# Patient Record
Sex: MALE | Race: WHITE | NOT HISPANIC OR LATINO | Employment: UNEMPLOYED | URBAN - METROPOLITAN AREA
[De-identification: names, ages, dates, MRNs, and addresses within clinical notes are randomized per-mention and may not be internally consistent; named-entity substitution may affect disease eponyms.]

---

## 2020-11-10 ENCOUNTER — RECORDS - HEALTHEAST (OUTPATIENT)
Dept: LAB | Facility: CLINIC | Age: 55
End: 2020-11-10

## 2020-11-11 LAB
ALBUMIN SERPL-MCNC: 3.1 G/DL (ref 3.5–5)
ALP SERPL-CCNC: 83 U/L (ref 45–120)
ALT SERPL W P-5'-P-CCNC: 18 U/L (ref 0–45)
ANION GAP SERPL CALCULATED.3IONS-SCNC: 8 MMOL/L (ref 5–18)
AST SERPL W P-5'-P-CCNC: 13 U/L (ref 0–40)
BILIRUB SERPL-MCNC: 0.4 MG/DL (ref 0–1)
BUN SERPL-MCNC: 17 MG/DL (ref 8–22)
CALCIUM SERPL-MCNC: 8.9 MG/DL (ref 8.5–10.5)
CHLORIDE BLD-SCNC: 103 MMOL/L (ref 98–107)
CO2 SERPL-SCNC: 28 MMOL/L (ref 22–31)
CREAT SERPL-MCNC: 1.01 MG/DL (ref 0.7–1.3)
ERYTHROCYTE [DISTWIDTH] IN BLOOD BY AUTOMATED COUNT: 13.9 % (ref 11–14.5)
GFR SERPL CREATININE-BSD FRML MDRD: >60 ML/MIN/1.73M2
GLUCOSE BLD-MCNC: 41 MG/DL (ref 70–125)
HCT VFR BLD AUTO: 38.3 % (ref 40–54)
HGB BLD-MCNC: 12.6 G/DL (ref 14–18)
MAGNESIUM SERPL-MCNC: 1.9 MG/DL (ref 1.8–2.6)
MCH RBC QN AUTO: 30.4 PG (ref 27–34)
MCHC RBC AUTO-ENTMCNC: 32.9 G/DL (ref 32–36)
MCV RBC AUTO: 93 FL (ref 80–100)
PLATELET # BLD AUTO: 174 THOU/UL (ref 140–440)
PMV BLD AUTO: 11.3 FL (ref 8.5–12.5)
POTASSIUM BLD-SCNC: 5.1 MMOL/L (ref 3.5–5)
PROT SERPL-MCNC: 6.6 G/DL (ref 6–8)
RBC # BLD AUTO: 4.14 MILL/UL (ref 4.4–6.2)
SODIUM SERPL-SCNC: 139 MMOL/L (ref 136–145)
VIT B12 SERPL-MCNC: 235 PG/ML (ref 213–816)
WBC: 6.5 THOU/UL (ref 4–11)

## 2020-11-19 ENCOUNTER — RECORDS - HEALTHEAST (OUTPATIENT)
Dept: LAB | Facility: CLINIC | Age: 55
End: 2020-11-19

## 2020-11-20 ENCOUNTER — RECORDS - HEALTHEAST (OUTPATIENT)
Dept: LAB | Facility: CLINIC | Age: 55
End: 2020-11-20

## 2020-11-23 LAB
ALBUMIN SERPL-MCNC: 3.1 G/DL (ref 3.5–5)
ALP SERPL-CCNC: 93 U/L (ref 45–120)
ALT SERPL W P-5'-P-CCNC: <9 U/L (ref 0–45)
ANION GAP SERPL CALCULATED.3IONS-SCNC: 7 MMOL/L (ref 5–18)
AST SERPL W P-5'-P-CCNC: 12 U/L (ref 0–40)
BILIRUB SERPL-MCNC: 0.3 MG/DL (ref 0–1)
BUN SERPL-MCNC: 17 MG/DL (ref 8–22)
CALCIUM SERPL-MCNC: 8.9 MG/DL (ref 8.5–10.5)
CHLORIDE BLD-SCNC: 105 MMOL/L (ref 98–107)
CO2 SERPL-SCNC: 30 MMOL/L (ref 22–31)
CREAT SERPL-MCNC: 1.01 MG/DL (ref 0.7–1.3)
GFR SERPL CREATININE-BSD FRML MDRD: >60 ML/MIN/1.73M2
GLUCOSE BLD-MCNC: 76 MG/DL (ref 70–125)
HGB BLD-MCNC: 12.6 G/DL (ref 14–18)
POTASSIUM BLD-SCNC: 3.8 MMOL/L (ref 3.5–5)
PROT SERPL-MCNC: 6.6 G/DL (ref 6–8)
PSA SERPL-MCNC: 4.8 NG/ML (ref 0–3.5)
SODIUM SERPL-SCNC: 142 MMOL/L (ref 136–145)

## 2020-11-29 ENCOUNTER — MEDICAL CORRESPONDENCE (OUTPATIENT)
Dept: HEALTH INFORMATION MANAGEMENT | Facility: CLINIC | Age: 55
End: 2020-11-29

## 2020-11-30 ENCOUNTER — RECORDS - HEALTHEAST (OUTPATIENT)
Dept: LAB | Facility: CLINIC | Age: 55
End: 2020-11-30

## 2020-12-01 LAB
IRON SATN MFR SERPL: 8 % (ref 20–50)
IRON SERPL-MCNC: 17 UG/DL (ref 42–175)
TIBC SERPL-MCNC: 205 UG/DL (ref 313–563)
TRANSFERRIN SERPL-MCNC: 164 MG/DL (ref 212–360)

## 2020-12-02 ENCOUNTER — MEDICAL CORRESPONDENCE (OUTPATIENT)
Dept: HEALTH INFORMATION MANAGEMENT | Facility: CLINIC | Age: 55
End: 2020-12-02

## 2020-12-03 ENCOUNTER — RECORDS - HEALTHEAST (OUTPATIENT)
Dept: LAB | Facility: CLINIC | Age: 55
End: 2020-12-03

## 2020-12-03 ENCOUNTER — TRANSCRIBE ORDERS (OUTPATIENT)
Dept: OTHER | Age: 55
End: 2020-12-03

## 2020-12-03 DIAGNOSIS — N40.1 ENLARGED PROSTATE WITH LOWER URINARY TRACT SYMPTOMS (LUTS): Primary | ICD-10-CM

## 2020-12-03 DIAGNOSIS — R33.8 OTHER RETENTION OF URINE: ICD-10-CM

## 2020-12-03 DIAGNOSIS — R97.20 ELEVATED PROSTATE SPECIFIC ANTIGEN (PSA): ICD-10-CM

## 2020-12-03 LAB
ALBUMIN UR-MCNC: ABNORMAL MG/DL
APPEARANCE UR: ABNORMAL
BACTERIA #/AREA URNS HPF: ABNORMAL HPF
BILIRUB UR QL STRIP: NEGATIVE
COLOR UR AUTO: YELLOW
GLUCOSE UR STRIP-MCNC: NEGATIVE MG/DL
HGB UR QL STRIP: ABNORMAL
KETONES UR STRIP-MCNC: NEGATIVE MG/DL
LEUKOCYTE ESTERASE UR QL STRIP: ABNORMAL
MUCOUS THREADS #/AREA URNS LPF: ABNORMAL LPF
NITRATE UR QL: NEGATIVE
PH UR STRIP: 6 [PH] (ref 4.5–8)
RBC #/AREA URNS AUTO: ABNORMAL HPF
RENAL EPI CELLS #/AREA URNS HPF: ABNORMAL LPF
SP GR UR STRIP: 1.03 (ref 1–1.03)
SQUAMOUS #/AREA URNS AUTO: ABNORMAL LPF
UROBILINOGEN UR STRIP-ACNC: ABNORMAL
WBC #/AREA URNS AUTO: ABNORMAL HPF
WBC CLUMPS #/AREA URNS HPF: PRESENT /[HPF]

## 2020-12-05 LAB — BACTERIA SPEC CULT: ABNORMAL

## 2020-12-08 ENCOUNTER — RECORDS - HEALTHEAST (OUTPATIENT)
Dept: LAB | Facility: CLINIC | Age: 55
End: 2020-12-08

## 2020-12-09 LAB
ALBUMIN SERPL-MCNC: 3.5 G/DL (ref 3.5–5)
ALP SERPL-CCNC: 100 U/L (ref 45–120)
ALT SERPL W P-5'-P-CCNC: 12 U/L (ref 0–45)
ANION GAP SERPL CALCULATED.3IONS-SCNC: 12 MMOL/L (ref 5–18)
AST SERPL W P-5'-P-CCNC: 16 U/L (ref 0–40)
BILIRUB SERPL-MCNC: 0.5 MG/DL (ref 0–1)
BUN SERPL-MCNC: 18 MG/DL (ref 8–22)
CALCIUM SERPL-MCNC: 9.1 MG/DL (ref 8.5–10.5)
CHLORIDE BLD-SCNC: 102 MMOL/L (ref 98–107)
CO2 SERPL-SCNC: 28 MMOL/L (ref 22–31)
CREAT SERPL-MCNC: 1.15 MG/DL (ref 0.7–1.3)
GFR SERPL CREATININE-BSD FRML MDRD: >60 ML/MIN/1.73M2
GLUCOSE BLD-MCNC: 88 MG/DL (ref 70–125)
POTASSIUM BLD-SCNC: 3.6 MMOL/L (ref 3.5–5)
PROT SERPL-MCNC: 7.5 G/DL (ref 6–8)
SODIUM SERPL-SCNC: 142 MMOL/L (ref 136–145)

## 2020-12-10 ENCOUNTER — RECORDS - HEALTHEAST (OUTPATIENT)
Dept: LAB | Facility: CLINIC | Age: 55
End: 2020-12-10

## 2020-12-11 LAB
ANION GAP SERPL CALCULATED.3IONS-SCNC: 10 MMOL/L (ref 5–18)
BASOPHILS # BLD AUTO: 0 THOU/UL (ref 0–0.2)
BASOPHILS NFR BLD AUTO: 0 % (ref 0–2)
BUN SERPL-MCNC: 28 MG/DL (ref 8–22)
CALCIUM SERPL-MCNC: 8.9 MG/DL (ref 8.5–10.5)
CHLORIDE BLD-SCNC: 107 MMOL/L (ref 98–107)
CO2 SERPL-SCNC: 26 MMOL/L (ref 22–31)
CREAT SERPL-MCNC: 1.18 MG/DL (ref 0.7–1.3)
EOSINOPHIL # BLD AUTO: 0.1 THOU/UL (ref 0–0.4)
EOSINOPHIL NFR BLD AUTO: 1 % (ref 0–6)
ERYTHROCYTE [DISTWIDTH] IN BLOOD BY AUTOMATED COUNT: 13.3 % (ref 11–14.5)
GFR SERPL CREATININE-BSD FRML MDRD: >60 ML/MIN/1.73M2
GLUCOSE BLD-MCNC: 92 MG/DL (ref 70–125)
HCT VFR BLD AUTO: 36.9 % (ref 40–54)
HGB BLD-MCNC: 12.1 G/DL (ref 14–18)
IMM GRANULOCYTES # BLD: 0 THOU/UL
IMM GRANULOCYTES NFR BLD: 0 %
LYMPHOCYTES # BLD AUTO: 1.2 THOU/UL (ref 0.8–4.4)
LYMPHOCYTES NFR BLD AUTO: 18 % (ref 20–40)
MCH RBC QN AUTO: 29.7 PG (ref 27–34)
MCHC RBC AUTO-ENTMCNC: 32.8 G/DL (ref 32–36)
MCV RBC AUTO: 91 FL (ref 80–100)
MONOCYTES # BLD AUTO: 0.7 THOU/UL (ref 0–0.9)
MONOCYTES NFR BLD AUTO: 10 % (ref 2–10)
NEUTROPHILS # BLD AUTO: 4.8 THOU/UL (ref 2–7.7)
NEUTROPHILS NFR BLD AUTO: 70 % (ref 50–70)
PLATELET # BLD AUTO: 252 THOU/UL (ref 140–440)
PMV BLD AUTO: 10.2 FL (ref 8.5–12.5)
POTASSIUM BLD-SCNC: 4 MMOL/L (ref 3.5–5)
RBC # BLD AUTO: 4.07 MILL/UL (ref 4.4–6.2)
SODIUM SERPL-SCNC: 143 MMOL/L (ref 136–145)
WBC: 6.8 THOU/UL (ref 4–11)

## 2020-12-11 NOTE — TELEPHONE ENCOUNTER
MEDICAL RECORDS REQUEST   [facility]  Urology Clinic  [address] [place] [address]  PHONE: [phone]  Fax: [phone]        FUTURE VISIT INFORMATION                                                   Rico Burton, : 1965 scheduled for future visit at [facility]    APPOINTMENT INFORMATION:    Date: 20 11:45AM    Provider:  Jose M SALGUERO    Reason for Visit/Diagnosis: Elevated PSA    REFERRAL INFORMATION:    Referring provider:  N/A    Specialty: N/A    Referring providers clinic:      Clinic contact number:  N/A    RECORDS REQUESTED FOR VISIT                                                     NOTES  STATUS/DETAILS   OFFICE NOTE from referring provider  yes   OFFICE NOTE from other specialist  no   DISCHARGE SUMMARY from hospital  yes   DISCHARGE REPORT from the ER  yes   OPERATIVE REPORT  no   MEDICATION LIST  no   LABS     URINALYSIS (UA)/ PSA  yes     PRE-VISIT CHECKLIST      Record collection complete Yes   Appointment appropriately scheduled           (right time/right provider) Yes   MyChart activation If no, please explain: In process    Questionnaire complete If no, please explain: In process      Completed by: Joy Merrill

## 2020-12-14 ENCOUNTER — PRE VISIT (OUTPATIENT)
Dept: UROLOGY | Facility: CLINIC | Age: 55
End: 2020-12-14

## 2020-12-18 ENCOUNTER — PRE VISIT (OUTPATIENT)
Dept: UROLOGY | Facility: CLINIC | Age: 55
End: 2020-12-18

## 2020-12-28 ENCOUNTER — PRE VISIT (OUTPATIENT)
Dept: UROLOGY | Facility: CLINIC | Age: 55
End: 2020-12-28

## 2020-12-28 NOTE — TELEPHONE ENCOUNTER
Visit Type : New - LUTS consult    History: Enlarged prostate w/LUTS, elevated PSA, retention of urine.      Records/Orders: No    Pt Contacted: Yes      At Rooming: Video - Please call [facility] and ask for Nicole or 2nd floor nursing station at [phone].

## 2021-01-07 ENCOUNTER — TRANSCRIBE ORDERS (OUTPATIENT)
Dept: OTHER | Age: 56
End: 2021-01-07

## 2021-01-07 DIAGNOSIS — D51.8 OTHER VITAMIN B12 DEFICIENCY ANEMIAS: Primary | ICD-10-CM

## 2021-01-07 DIAGNOSIS — R97.20 ELEVATED PROSTATE SPECIFIC ANTIGEN (PSA): ICD-10-CM

## 2021-01-07 DIAGNOSIS — R13.10 DYSPHAGIA: Primary | ICD-10-CM

## 2021-01-07 DIAGNOSIS — N40.1 ENLARGED PROSTATE WITH LOWER URINARY TRACT SYMPTOMS (LUTS): ICD-10-CM

## 2021-01-07 DIAGNOSIS — R33.8 OTHER RETENTION OF URINE: ICD-10-CM

## 2021-01-08 ENCOUNTER — TELEPHONE (OUTPATIENT)
Dept: UROLOGY | Facility: CLINIC | Age: 56
End: 2021-01-08

## 2021-01-11 ENCOUNTER — TELEPHONE (OUTPATIENT)
Dept: OTOLARYNGOLOGY | Facility: CLINIC | Age: 56
End: 2021-01-11

## 2021-01-11 NOTE — TELEPHONE ENCOUNTER
MEDICAL RECORDS REQUEST   [facility]  Urology Clinic  [address] [place] [address]  PHONE: [phone]  Fax: [phone]        FUTURE VISIT INFORMATION                                                   Rico Burton, : 1965 scheduled for future visit at [facility]    APPOINTMENT INFORMATION:    Date: 2021 8:30AM    Provider:  Jose M SALGUERO    Reason for Visit/Diagnosis: Elevated PSA    REFERRAL INFORMATION:    Referring provider:  N/A    Specialty: N/A    Referring providers clinic:      Clinic contact number:  N/A    RECORDS REQUESTED FOR VISIT                                                     NOTES  STATUS/DETAILS   OFFICE NOTE from referring provider  yes   OFFICE NOTE from other specialist  yes   DISCHARGE SUMMARY from hospital  no   DISCHARGE REPORT from the ER  no   OPERATIVE REPORT  no   MEDICATION LIST  no   LABS     URINALYSIS (UA)  yes     PRE-VISIT CHECKLIST      Record collection complete Yes   Appointment appropriately scheduled           (right time/right provider) Yes   MyChart activation If no, please explain: in process    Questionnaire complete If no, please explain: In process      Completed by: Joy Merrill

## 2021-01-11 NOTE — TELEPHONE ENCOUNTER
[facility] Call Center    Phone Message    May a detailed message be left on voicemail: yes     Reason for Call: Appointment Intake    Referring Provider Name: Brigette Gaona MD, MD in GENERIC EXTERNAL DATA DEPARTMENT  Diagnosis and/or Symptoms:Dysphagia     Please call stan ADAMSDI-[phone] to schedule    Action Taken: Message routed to:  [facility]    Travel Screening: Not Applicable

## 2021-01-12 NOTE — TELEPHONE ENCOUNTER
Writer called and spoke to a  at patient's nursing home, manager informed writer it would be better to call back later when pt's nurse arrives as she will be able to better report on pt's symptoms. Writer will call back later.    Maricruz Zhang

## 2021-01-12 NOTE — TELEPHONE ENCOUNTER
Called and spoke with patient's nurse. Nurse was unaware of the referral place. Nurse plans to gather some information and call back to discuss patient referral and symptoms. Provided call back number.    Jocelyn Coburn RN on 1/12/2021 at 11:53 AM

## 2021-01-13 ENCOUNTER — DOCUMENTATION ONLY (OUTPATIENT)
Dept: OTOLARYNGOLOGY | Facility: CLINIC | Age: 56
End: 2021-01-13

## 2021-01-13 NOTE — PROGRESS NOTES
10/30/20 mbss reviewed with pharyngeal weakness, slow oral phase and aspiration/penetration with thins - just the report reviewed under the admission encounter

## 2021-01-13 NOTE — TELEPHONE ENCOUNTER
Called and spoke with  who said Nicole was not available. Attempted to transfer me to another nurse, but also unavailable.    Left my direct line for nurse to call, to schedule pt.    Tentatively looking at 2/19 and 2/25.

## 2021-01-14 NOTE — TELEPHONE ENCOUNTER
FUTURE VISIT INFORMATION      FUTURE VISIT INFORMATION:    Date: 2/19/21    Time: 1 PM    [place] [facility]  REFERRAL INFORMATION:    Referring provider:  Dr. Brigette Gaona    Referring providers clinic:  [facility] (nursing home)    Reason for visit/diagnosis: Dysphagia    RECORDS REQUESTED FROM:       Clinic name Comments Records Status Imaging Status   [facility] - Imaging ([facility]) 4/16/21 - XR Video Swallow  ([facility]) 4/16/21 - XR Esophagram Epic PACs   [facility]  Phone: [phone]  Fax: [phone] No Actual Notes     [facility] 10/26/20 - Admission with Dr. Patel  8/13/16 - Admission with Dr. Panchal Care Everywhere    [facility] - Procedure 9/6/16 - EGD Care Everywhere    [facility] - Imaging 10/30/20 - XR Video Swallow  10/28/20 - XR Chest  9/4/16 - XR Chest  9/3/16 - XR Chest  9/2/16 - XR Chest Care Everywhere 1/14 Req - In PACs   [facility] [place] 10/26/20 - ED OV with LEATHA Miles  3/7/19 - ED OV with Dr. Vo Care Everywhere    [facility] - Imaging 10/26/20 - CTA Chest  10/26/20 - XR Chest  3/7/19 - XR Chest Care Everywhere 1/14 Req - In PACs     * 1/14/21 9:26 AM Faxed req to [facility] and [facility] for images to be pushed to [place] PACs - Lolita  * 1/28/21 7:42 AM Faxed 2nd urg req to [facility] for images to be pushed to [facility] PACs. - Lolita  * 1/29/21 11:30 AM Images received from [facility] and attached to patient in PACs. - Lolita

## 2021-01-21 ENCOUNTER — PRE VISIT (OUTPATIENT)
Dept: UROLOGY | Facility: CLINIC | Age: 56
End: 2021-01-21

## 2021-01-21 ENCOUNTER — DOCUMENTATION ONLY (OUTPATIENT)
Dept: CARE COORDINATION | Facility: CLINIC | Age: 56
End: 2021-01-21

## 2021-01-27 ENCOUNTER — TELEPHONE (OUTPATIENT)
Dept: OTOLARYNGOLOGY | Facility: CLINIC | Age: 56
End: 2021-01-27

## 2021-01-27 NOTE — TELEPHONE ENCOUNTER
Left  for Philip to discuss patient as requested. Provided direct call back number.    Jocelyn Coburn RN on 1/27/2021 at 9:39 AM

## 2021-01-29 ENCOUNTER — RECORDS - HEALTHEAST (OUTPATIENT)
Dept: LAB | Facility: CLINIC | Age: 56
End: 2021-01-29

## 2021-02-01 ENCOUNTER — RECORDS - HEALTHEAST (OUTPATIENT)
Dept: LAB | Facility: CLINIC | Age: 56
End: 2021-02-01

## 2021-02-01 LAB
ALBUMIN SERPL-MCNC: 3.3 G/DL (ref 3.5–5)
ALP SERPL-CCNC: 82 U/L (ref 45–120)
ALT SERPL W P-5'-P-CCNC: 14 U/L (ref 0–45)
ANION GAP SERPL CALCULATED.3IONS-SCNC: 7 MMOL/L (ref 5–18)
AST SERPL W P-5'-P-CCNC: 14 U/L (ref 0–40)
BILIRUB SERPL-MCNC: 0.4 MG/DL (ref 0–1)
BUN SERPL-MCNC: 20 MG/DL (ref 8–22)
CALCIUM SERPL-MCNC: 8.6 MG/DL (ref 8.5–10.5)
CHLORIDE BLD-SCNC: 103 MMOL/L (ref 98–107)
CO2 SERPL-SCNC: 31 MMOL/L (ref 22–31)
CREAT SERPL-MCNC: 1.05 MG/DL (ref 0.7–1.3)
GFR SERPL CREATININE-BSD FRML MDRD: >60 ML/MIN/1.73M2
GLUCOSE BLD-MCNC: 76 MG/DL (ref 70–125)
HGB BLD-MCNC: 12.7 G/DL (ref 14–18)
IRON SATN MFR SERPL: 31 % (ref 20–50)
IRON SERPL-MCNC: 74 UG/DL (ref 42–175)
IRON SERPL-MCNC: 74 UG/DL (ref 42–175)
POTASSIUM BLD-SCNC: 4.2 MMOL/L (ref 3.5–5)
PROT SERPL-MCNC: 6.4 G/DL (ref 6–8)
SODIUM SERPL-SCNC: 141 MMOL/L (ref 136–145)
TIBC SERPL-MCNC: 239 UG/DL (ref 313–563)
TRANSFERRIN SERPL-MCNC: 191 MG/DL (ref 212–360)
TSH SERPL DL<=0.005 MIU/L-ACNC: 1.72 UIU/ML (ref 0.3–5)
VIT B12 SERPL-MCNC: 602 PG/ML (ref 213–816)

## 2021-02-02 LAB
ALBUMIN SERPL-MCNC: 3.3 G/DL (ref 3.5–5)
ALP SERPL-CCNC: 80 U/L (ref 45–120)
ALT SERPL W P-5'-P-CCNC: 14 U/L (ref 0–45)
ANION GAP SERPL CALCULATED.3IONS-SCNC: 7 MMOL/L (ref 5–18)
AST SERPL W P-5'-P-CCNC: 16 U/L (ref 0–40)
BILIRUB SERPL-MCNC: 0.4 MG/DL (ref 0–1)
BUN SERPL-MCNC: 23 MG/DL (ref 8–22)
CALCIUM SERPL-MCNC: 8.7 MG/DL (ref 8.5–10.5)
CHLORIDE BLD-SCNC: 106 MMOL/L (ref 98–107)
CO2 SERPL-SCNC: 28 MMOL/L (ref 22–31)
CREAT SERPL-MCNC: 1.1 MG/DL (ref 0.7–1.3)
GFR SERPL CREATININE-BSD FRML MDRD: >60 ML/MIN/1.73M2
GLUCOSE BLD-MCNC: 70 MG/DL (ref 70–125)
HGB BLD-MCNC: 12.2 G/DL (ref 14–18)
IRON SATN MFR SERPL: 15 % (ref 20–50)
IRON SERPL-MCNC: 36 UG/DL (ref 42–175)
POTASSIUM BLD-SCNC: 3.7 MMOL/L (ref 3.5–5)
PROT SERPL-MCNC: 6.5 G/DL (ref 6–8)
SODIUM SERPL-SCNC: 141 MMOL/L (ref 136–145)
TIBC SERPL-MCNC: 236 UG/DL (ref 313–563)
TRANSFERRIN SERPL-MCNC: 189 MG/DL (ref 212–360)
TSH SERPL DL<=0.005 MIU/L-ACNC: 2.26 UIU/ML (ref 0.3–5)
VIT B12 SERPL-MCNC: 577 PG/ML (ref 213–816)

## 2021-02-04 NOTE — TELEPHONE ENCOUNTER
Spoke with Philip from patient's residence. Confirmed appointment times with Philip, confirmed these will work for patient. No further questions at this time.    Jocelyn Coburn RN on 2/4/2021 at 11:22 AM

## 2021-02-05 ENCOUNTER — PRE VISIT (OUTPATIENT)
Dept: UROLOGY | Facility: CLINIC | Age: 56
End: 2021-02-05

## 2021-02-15 ENCOUNTER — PRE VISIT (OUTPATIENT)
Dept: UROLOGY | Facility: CLINIC | Age: 56
End: 2021-02-15

## 2021-02-15 NOTE — TELEPHONE ENCOUNTER
Visit Type : Elevated PSA     Records/Orders: PSA in epic      Pt Contacted: no     At Rooming: video

## 2021-02-19 ENCOUNTER — PRE VISIT (OUTPATIENT)
Dept: OTOLARYNGOLOGY | Facility: CLINIC | Age: 56
End: 2021-02-19

## 2021-02-22 DIAGNOSIS — R13.10 DYSPHAGIA: Primary | ICD-10-CM

## 2021-02-22 NOTE — PROGRESS NOTES
Called [facility] manager, Philip, left a vm to confirm appointments with Dr Blackburn on 3/18. Video swallow study at 300 pm and in clinic appointment at 430 pm same day. Provided direct call back number.

## 2021-02-24 ENCOUNTER — TELEPHONE (OUTPATIENT)
Dept: OTOLARYNGOLOGY | Facility: CLINIC | Age: 56
End: 2021-02-24

## 2021-02-24 NOTE — TELEPHONE ENCOUNTER
Residence returned call. Confirmed patient's appointments on 3/18/2021. Video swallow at 3 pm and then clinic scope at 430 pm with Dr Blackburn. Confirmed understanding. No further questions at this time.    Jocelyn Coburn RN on 2/24/2021 at 10:01 AM

## 2021-02-25 PROBLEM — I71.00 AORTIC DISSECTION (H): Status: ACTIVE | Noted: 2020-10-26

## 2021-02-25 PROBLEM — I71.019 CHRONIC THORACIC AORTIC DISSECTION (H): Status: ACTIVE | Noted: 2020-10-26

## 2021-02-25 PROBLEM — R62.7 ADULT FAILURE TO THRIVE: Status: ACTIVE | Noted: 2020-10-26

## 2021-02-26 ENCOUNTER — VIRTUAL VISIT (OUTPATIENT)
Dept: UROLOGY | Facility: CLINIC | Age: 56
End: 2021-02-26
Payer: COMMERCIAL

## 2021-02-26 DIAGNOSIS — Z53.9 ERRONEOUS ENCOUNTER--DISREGARD: Primary | ICD-10-CM

## 2021-02-26 NOTE — LETTER
"2/26/2021       RE: Rico Burton  [address] [address]     Dear Colleague,    Thank you for referring your patient, Rico Burton, to the [facility] UROLOGY CLINIC [place] at [facility]. Please see a copy of my visit note below.    Video Visit Technology for this patient: {esawell:553219}   Rico is a 55 year old who is being evaluated via a billable video visit.      How would you like to obtain your AVS? {AVS Preference:725721}  If the video visit is dropped, the invitation should be resent by: {video visit invitation:202742}  Will anyone else be joining your video visit? {:124710}  {If patient encounters technical issues they should call [phone] :313142}    Video Start Time: {video visit start/end time for provider to select:440117}  Video-Visit Details    Type of service:  Video Visit    Video End Time:{video visit start/end time for provider to select:377531}    Originating Location (pt. Location): {video visit patient location:574738::\"Home\"}    Distant Location (provider location):  [facility] UROLOGY [facility]     Platform used for Video Visit: {Virtual Visit Platforms:534561::\"AmWell\"}    "

## 2021-03-02 ENCOUNTER — RECORDS - HEALTHEAST (OUTPATIENT)
Dept: LAB | Facility: CLINIC | Age: 56
End: 2021-03-02

## 2021-03-02 LAB
SARS-COV-2 PCR COMMENT: NORMAL
SARS-COV-2 RNA SPEC QL NAA+PROBE: NEGATIVE
SARS-COV-2 VIRUS SPECIMEN SOURCE: NORMAL

## 2021-03-03 ENCOUNTER — TELEPHONE (OUTPATIENT)
Dept: UROLOGY | Facility: CLINIC | Age: 56
End: 2021-03-03

## 2021-03-03 NOTE — TELEPHONE ENCOUNTER
[facility] Call Center    Phone Message    May a detailed message be left on voicemail: yes     Reason for Call: Other: stan from [facility], would like the chart notes from pts visit on 2/26 faxed over to [phone] thank you      Action Taken: Message routed to:  [facility] uro    Travel Screening: Not Applicable

## 2021-03-03 NOTE — TELEPHONE ENCOUNTER
Spoke with nurse at care facility and scheduled pt for an in person appointment. Pt will have a nurse with

## 2021-03-03 NOTE — TELEPHONE ENCOUNTER
Hi all,    Nicole from [facility] was notified that the patient did not have his consult on 2/26/2021.     Trisha- Patient was not no showed for this appointment-so it looks like he establish care with Dr. Salguero. I do not know if billing will catch this or not.      Urology clinic coordinator schedulers- please contact Nicole to schedule an in clinic consult appointment Dr. Salguero. Please reach out to Shakila Lester MA-clinic assistant for Dr. Salguero if you have any scheduling questions.    Thanks all,      Jacqui Alegria MA

## 2021-03-05 ENCOUNTER — RECORDS - HEALTHEAST (OUTPATIENT)
Dept: LAB | Facility: CLINIC | Age: 56
End: 2021-03-05

## 2021-03-08 LAB
ALBUMIN SERPL-MCNC: 3.3 G/DL (ref 3.5–5)
HGB BLD-MCNC: 13.3 G/DL (ref 14–18)

## 2021-03-10 ENCOUNTER — RECORDS - HEALTHEAST (OUTPATIENT)
Dept: LAB | Facility: CLINIC | Age: 56
End: 2021-03-10

## 2021-03-10 LAB
SARS-COV-2 PCR COMMENT: ABNORMAL
SARS-COV-2 RNA SPEC QL NAA+PROBE: POSITIVE
SARS-COV-2 VIRUS SPECIMEN SOURCE: ABNORMAL

## 2021-03-17 ENCOUNTER — TELEPHONE (OUTPATIENT)
Dept: OTOLARYNGOLOGY | Facility: CLINIC | Age: 56
End: 2021-03-17

## 2021-03-17 NOTE — TELEPHONE ENCOUNTER
Spoke to care facility to confirm appointment tomorrow with Dr Blackburn. Patient will not be coming in to appointment because patient tested positive for covid-19. Patient is in quarantine until 3/25/2021.    Clinic will call back to reschedule appointments.    Jocelyn Coburn RN on 3/17/2021 at 12:09 PM

## 2021-03-19 ENCOUNTER — TELEPHONE (OUTPATIENT)
Dept: OTOLARYNGOLOGY | Facility: CLINIC | Age: 56
End: 2021-03-19

## 2021-03-19 NOTE — TELEPHONE ENCOUNTER
Called [phone] and phone kept ringing, no answer, line didn't give option for VM.    Called [phone] and got busy tone both times.     Attempting to reschedule appt with Dr. Blackburn and Video Swallow Study.    Message sent back to provider RN.

## 2021-03-22 ENCOUNTER — TELEPHONE (OUTPATIENT)
Dept: OTOLARYNGOLOGY | Facility: CLINIC | Age: 56
End: 2021-03-22

## 2021-03-22 NOTE — TELEPHONE ENCOUNTER
Left voicemail for . Informed that patient has been scheduled for vfss 4/16 at 1 pm and in clinic appointment 4/29 at 430 pm. Encouraged call back to confirm these appointments. Provided direct number.     Jocelyn Coburn RN on 3/22/2021 at 10:31 AM

## 2021-03-30 ENCOUNTER — PRE VISIT (OUTPATIENT)
Dept: UROLOGY | Facility: CLINIC | Age: 56
End: 2021-03-30

## 2021-03-30 ENCOUNTER — RECORDS - HEALTHEAST (OUTPATIENT)
Dept: LAB | Facility: CLINIC | Age: 56
End: 2021-03-30

## 2021-03-31 LAB
BASOPHILS # BLD AUTO: 0.1 THOU/UL (ref 0–0.2)
BASOPHILS NFR BLD AUTO: 1 % (ref 0–2)
EOSINOPHIL # BLD AUTO: 0.6 THOU/UL (ref 0–0.4)
EOSINOPHIL NFR BLD AUTO: 8 % (ref 0–6)
ERYTHROCYTE [DISTWIDTH] IN BLOOD BY AUTOMATED COUNT: 13.1 % (ref 11–14.5)
HCT VFR BLD AUTO: 45.8 % (ref 40–54)
HGB BLD-MCNC: 14.9 G/DL (ref 14–18)
IMM GRANULOCYTES # BLD: 0 THOU/UL
IMM GRANULOCYTES NFR BLD: 0 %
LYMPHOCYTES # BLD AUTO: 1.7 THOU/UL (ref 0.8–4.4)
LYMPHOCYTES NFR BLD AUTO: 22 % (ref 20–40)
MCH RBC QN AUTO: 29.6 PG (ref 27–34)
MCHC RBC AUTO-ENTMCNC: 32.5 G/DL (ref 32–36)
MCV RBC AUTO: 91 FL (ref 80–100)
MONOCYTES # BLD AUTO: 0.7 THOU/UL (ref 0–0.9)
MONOCYTES NFR BLD AUTO: 9 % (ref 2–10)
NEUTROPHILS # BLD AUTO: 4.7 THOU/UL (ref 2–7.7)
NEUTROPHILS NFR BLD AUTO: 61 % (ref 50–70)
PLATELET # BLD AUTO: 240 THOU/UL (ref 140–440)
PMV BLD AUTO: 10.3 FL (ref 8.5–12.5)
RBC # BLD AUTO: 5.04 MILL/UL (ref 4.4–6.2)
WBC: 7.7 THOU/UL (ref 4–11)

## 2021-04-06 NOTE — TELEPHONE ENCOUNTER
Spoke to nurse at [facility] 2nd floor to confirm appointments. Informed of all current appointment dates and times. Agreed to plan. Provided direct call back number if there are any questions or concerns.    Jocelyn Coburn RN on 4/6/2021 at 10:41 AM

## 2021-04-16 ENCOUNTER — DOCUMENTATION ONLY (OUTPATIENT)
Dept: OTOLARYNGOLOGY | Facility: CLINIC | Age: 56
End: 2021-04-16

## 2021-04-16 ENCOUNTER — OFFICE VISIT (OUTPATIENT)
Dept: UROLOGY | Facility: CLINIC | Age: 56
End: 2021-04-16
Payer: COMMERCIAL

## 2021-04-16 ENCOUNTER — ANCILLARY PROCEDURE (OUTPATIENT)
Dept: GENERAL RADIOLOGY | Facility: CLINIC | Age: 56
End: 2021-04-16
Attending: OTOLARYNGOLOGY
Payer: COMMERCIAL

## 2021-04-16 ENCOUNTER — THERAPY VISIT (OUTPATIENT)
Dept: SPEECH THERAPY | Facility: CLINIC | Age: 56
End: 2021-04-16
Payer: COMMERCIAL

## 2021-04-16 VITALS
HEART RATE: 67 BPM | SYSTOLIC BLOOD PRESSURE: 133 MMHG | BODY MASS INDEX: 22.24 KG/M2 | DIASTOLIC BLOOD PRESSURE: 86 MMHG | HEIGHT: 66 IN | WEIGHT: 138.4 LBS

## 2021-04-16 DIAGNOSIS — R13.10 DYSPHAGIA: ICD-10-CM

## 2021-04-16 DIAGNOSIS — R39.9 LOWER URINARY TRACT SYMPTOMS (LUTS): Primary | ICD-10-CM

## 2021-04-16 DIAGNOSIS — R13.10 DYSPHAGIA, UNSPECIFIED TYPE: Primary | ICD-10-CM

## 2021-04-16 LAB
ALBUMIN UR-MCNC: NEGATIVE MG/DL
APPEARANCE UR: CLEAR
BACTERIA #/AREA URNS HPF: ABNORMAL /HPF
BILIRUB UR QL STRIP: NEGATIVE
COLOR UR AUTO: YELLOW
GLUCOSE UR STRIP-MCNC: NEGATIVE MG/DL
HGB UR QL STRIP: NEGATIVE
KETONES UR STRIP-MCNC: NEGATIVE MG/DL
LEUKOCYTE ESTERASE UR QL STRIP: ABNORMAL
MUCOUS THREADS #/AREA URNS LPF: PRESENT /LPF
NITRATE UR QL: NEGATIVE
PH UR STRIP: 5 PH (ref 5–7)
RBC #/AREA URNS AUTO: 2 /HPF (ref 0–2)
SOURCE: ABNORMAL
SP GR UR STRIP: 1.01 (ref 1–1.03)
UROBILINOGEN UR STRIP-MCNC: 0 MG/DL (ref 0–2)
WBC #/AREA URNS AUTO: 5 /HPF (ref 0–5)

## 2021-04-16 PROCEDURE — 74230 X-RAY XM SWLNG FUNCJ C+: CPT | Mod: GC | Performed by: RADIOLOGY

## 2021-04-16 PROCEDURE — 99203 OFFICE O/P NEW LOW 30 MIN: CPT | Performed by: STUDENT IN AN ORGANIZED HEALTH CARE EDUCATION/TRAINING PROGRAM

## 2021-04-16 PROCEDURE — 92611 MOTION FLUOROSCOPY/SWALLOW: CPT | Mod: GN | Performed by: SPEECH-LANGUAGE PATHOLOGIST

## 2021-04-16 PROCEDURE — 92610 EVALUATE SWALLOWING FUNCTION: CPT | Mod: GN | Performed by: SPEECH-LANGUAGE PATHOLOGIST

## 2021-04-16 PROCEDURE — 81001 URINALYSIS AUTO W/SCOPE: CPT | Performed by: PATHOLOGY

## 2021-04-16 RX ORDER — AMLODIPINE BESYLATE 5 MG/1
5 TABLET ORAL
COMMUNITY
Start: 2020-11-04

## 2021-04-16 RX ORDER — METOPROLOL SUCCINATE 50 MG/1
50 TABLET, EXTENDED RELEASE ORAL
COMMUNITY
Start: 2020-11-04

## 2021-04-16 RX ORDER — TAMSULOSIN HYDROCHLORIDE 0.4 MG/1
CAPSULE ORAL
COMMUNITY
Start: 2021-03-26

## 2021-04-16 RX ORDER — ALBUTEROL SULFATE 90 UG/1
AEROSOL, METERED RESPIRATORY (INHALATION)
COMMUNITY
Start: 2021-03-30

## 2021-04-16 RX ORDER — MULTIVIT-MIN/IRON/FOLIC ACID/K 18-600-40
CAPSULE ORAL
COMMUNITY

## 2021-04-16 RX ORDER — BARIUM SULFATE 400 MG/ML
30 SUSPENSION ORAL ONCE
Status: COMPLETED | OUTPATIENT
Start: 2021-04-16 | End: 2021-04-16

## 2021-04-16 RX ORDER — FAMOTIDINE 20 MG/1
20 TABLET, FILM COATED ORAL
COMMUNITY
Start: 2020-11-03

## 2021-04-16 RX ORDER — BARIUM SULFATE 400 MG/ML
25 SUSPENSION ORAL ONCE
Status: COMPLETED | OUTPATIENT
Start: 2021-04-16 | End: 2021-04-16

## 2021-04-16 RX ORDER — FERROUS SULFATE 325(65) MG
325 TABLET, DELAYED RELEASE (ENTERIC COATED) ORAL DAILY
COMMUNITY

## 2021-04-16 RX ADMIN — BARIUM SULFATE 25 ML: 400 SUSPENSION ORAL at 13:58

## 2021-04-16 RX ADMIN — BARIUM SULFATE 30 ML: 400 SUSPENSION ORAL at 13:55

## 2021-04-16 ASSESSMENT — PAIN SCALES - GENERAL: PAINLEVEL: NO PAIN (0)

## 2021-04-16 ASSESSMENT — MIFFLIN-ST. JEOR: SCORE: 1390.53

## 2021-04-16 NOTE — DISCHARGE INSTRUCTIONS
Videofluoroscopic Swallow Study Results    Problem Identified: Aspiration noted on thin liquid and nectar thick liquid consistencies. Cough demonstrated in response to aspiration however this did not clear the aspirated material from the airway. Delayed initiation of the swallow noted which is the cause of the aspiration as it occurs before the swallow. No residue noted in the valleculae or pyriform sinus after the completed swallow.     Diet Recommended: Dysphagia diet level 2 with honey thickened liquids. Pills whole in puree. Free water protocol after thorough oral cares.     Swallowing Suggestions:  Sit upright at 90 degrees  Eat slowly  Chew carefully  Do not talk while chewing or swallowing  Check for oral residue after each bite  Liquids by spoon only  No straw  Small bites/sips  Alternate liquids and solids    Additional information provided:    List of foods appropriate for Level 2 Dysphagia Diet    Follow up: You will see Dr. Blackburn 4/29 at 4:30

## 2021-04-16 NOTE — LETTER
4/16/2021       RE: Rico Burton  [address] [address]     Dear Colleague,    Thank you for referring your patient, Rico Burton, to the [facility] UROLOGY CLINIC [place] at [facility]. Please see a copy of my visit note below.          Chief Complaint:    Referred for elevated PSA           Consult or Referral:     Mr. Rico Burton is a 56 year old male seen at the request of Dr. Chavez.         History of Present Illness:     Rico Burton is a 56 year old male being seen for ELEVATED psa.  Rico lives in a group home in [place] and is not accompanied by anyone today. It was difficult to assess his issues as he is a poor historian and difficult to elucidate his history.    He seems to have some catheter related issues with possible catheter related trauma in the past and now is apparently catheter free and passing urine on his own. He says he has no issues with urination and has good control, but is here with adult diaper  which is wet.         Past Medical History:     Past Medical History:   Diagnosis Date     Acute blood loss anemia 09/07/2016    Formatting of this note might be different from the original. Transfused one unit prbc on 9/6/16.  Was having bloody secretions for several days.  No dramatic drop in hgb.     Acute cephalic vein thrombosis, right 09/07/2016    Formatting of this note might be different from the original. By doppler 8/22/16.  No change on 9/2/16 follow up.     Acute deep vein thrombosis (DVT) of tibial vein of right lower extremity (H) 09/07/2016    Formatting of this note might be different from the original. Treated with heparin for about two weeks.  Bloody pulmonary secretions increased on IV heparin.  I felt like clot limited to calf vein was low risk for pulmonary embolism.  Because of bloody secretions, bad gas exchange I stopped heparin IV on 9/5/16, and just treated with SQ heparin preventative  dose.  I felt risk of PE was too low to      Acute respiratory failure with hypoxia (H) 08/17/2016     Adult failure to thrive 10/26/2020     AMMIE (acute kidney injury) (H) 08/17/2016     Alcohol abuse      Aortic dissection (H) 10/26/2020     C. difficile colitis 08/22/2016    Formatting of this note might be different from the original. Started po flagyl 8/21/16.  Change to po vanco on 9/5/16 due to continued diarrhea.  Recommend tx with po vanco for 10 days.     CAP (community acquired pneumonia) 08/22/2016    Formatting of this note might be different from the original. S. Pneumonia in sputum on 8/17/16 with diffuse infiltrates.  Was treated with appropriate abx.     Chronic thoracic aortic dissection (H) 10/26/2020     Essential hypertension 08/17/2016     Hypertensive emergency 08/13/2016     Metabolic encephalopathy 08/17/2016     Methamphetamine abuse (H) 08/17/2016     Pulmonary hemorrhage 09/07/2016    Formatting of this note might be different from the original. Frequent bloody pulmonary secretions on suctioing.  Bronchoscopy on 9/6/16 did not show benitez central lesion to explain bleeding.     Pulmonary hypertension (H) 08/17/2016     Renal infarction (H) 08/17/2016     Tobacco abuse 08/17/2016            Past Surgical History:   No past surgical history on file.         Medications     Current Outpatient Medications   Medication     albuterol (PROAIR HFA/PROVENTIL HFA/VENTOLIN HFA) 108 (90 Base) MCG/ACT inhaler     amLODIPine (NORVASC) 5 MG tablet     Ascorbic Acid (VITAMIN C) 500 MG CAPS     cholecalciferol 25 MCG (1000 UT) TABS     famotidine (PEPCID) 20 MG tablet     ferrous sulfate (FE TABS) 325 (65 Fe) MG EC tablet     metoprolol succinate ER (TOPROL-XL) 50 MG 24 hr tablet     Multiple Vitamins-Minerals (DAILY MULTI PO)     Nutritional Supplements (NUTRITIONAL SUPPLEMENT PO)     tamsulosin (FLOMAX) 0.4 MG capsule     vitamin B-12 (CYANOCOBALAMIN) 1000 MCG tablet     No current facility-administered  medications for this visit.             Family History:   No family history on file.         Social History:     Social History     Socioeconomic History     Marital status: Single     Spouse name: Not on file     Number of children: Not on file     Years of education: Not on file     Highest education level: Not on file   Occupational History     Not on file   Social Needs     Financial resource strain: Not on file     Food insecurity     Worry: Not on file     Inability: Not on file     Transportation needs     Medical: Not on file     Non-medical: Not on file   Tobacco Use     Smoking status: Former Smoker     Types: Cigarettes   Substance and Sexual Activity     Alcohol use: Not on file     Drug use: Not on file     Sexual activity: Not on file   Lifestyle     Physical activity     Days per week: Not on file     Minutes per session: Not on file     Stress: Not on file   Relationships     Social connections     Talks on phone: Not on file     Gets together: Not on file     Attends [employer] service: Not on file     Active member of club or organization: Not on file     Attends meetings of clubs or organizations: Not on file     Relationship status: Not on file     Intimate partner violence     Fear of current or ex partner: Not on file     Emotionally abused: Not on file     Physically abused: Not on file     Forced sexual activity: Not on file   Other Topics Concern     Not on file   Social History Narrative     Not on file            Allergies:   Patient has no known allergies.         Review of Systems:  From intake questionnaire     Skin: negative  Eyes: negative  Ears/Nose/Throat: negative  Respiratory: No shortness of breath, dyspnea on exertion, cough, or hemoptysis  Cardiovascular: No chest pain or palpitations  Gastrointestinal: negative; no nausea/vomiting, constipation or diarrhea  Genitourinary: as per HPI  Musculoskeletal: negative  Neurologic: negative  Psychiatric:  "negative  Hematologic/Lymphatic/Immunologic: negative  Endocrine: negative         Physical Exam:     Patient is a 56 year old  male   Vitals: Blood pressure 133/86, pulse 67, height 1.676 m (5' 6\"), weight 62.8 kg (138 lb 6.4 oz).  Constitutional: Body mass index is 22.34 kg/m .  Alert, no acute distress, oriented, conversant  Eyes: no scleral icterus; extraocular muscles intact, moist conjunctivae  Neck: trachea midline, no thyromegaly  Ears/nose/mouth: throat/mouth:normal, good dentition  Respiratory: no respiratory distress, or pursed lip breathing  Cardiovascular: pulses strong and intact; no obvious jugular venous distension present  Gastrointestinal: soft, nontender, no organomegaly or masses,   Lymphatics: No inguinal adenopathy  Musculoskeletal: extremities normal, no peripheral edema  Skin: no suspicious lesions or rashes  Neuro: Alert, oriented, speech and mentation normal  Psych: affect and mood normal, alert and oriented to person, place and time  Gait: Normal  : penis, scrotum, testes normal, DESHAWN anodular, symmetric      Labs and Pathology:    The following labs were reviewed by me and discussed with the patient:  Creatinine: Normal  Significant for No results found for: CR  No results found for: PSA          Imaging:    The following imaging exams were independently viewed and interpreted by me and discussed with patient:    NA             Assessment and Plan:     Lower urinary tract symptoms (LUTS)  Review with these results  - PSA tumor marker; Future  - UA with Microscopic reflex to Culture; Future  - UA with Microscopic reflex to Culture          Orders  Orders Placed This Encounter   Procedures     PSA tumor marker     UA with Microscopic reflex to Culture       Darien Salguero MD  [facility] UROLOGY CLINIC [place]      ==========================    Additional Billing and Coding Information:  Review of external notes as documented above   Review of the result(s) of each unique test " - Creatinine    Independent interpretation of a test performed by another physician/other qualified health care professional (not separately reported) - NA    Discussion of management or test interpretation with external physician/other qualified healthcare professional/appropriate source - NA    Diagnosis or treatment significantly limited by social determinants of health - Group home inhabitant with difficulty in communication.    35 minutes spent on the date of the encounter doing chart review, review of test results, interpretation of tests, patient visit and documentation

## 2021-04-16 NOTE — NURSING NOTE
"Chief Complaint   Patient presents with     Elevated PSA       Blood pressure 133/86, pulse 67, height 1.676 m (5' 6\"), weight 62.8 kg (138 lb 6.4 oz). Body mass index is 22.34 kg/m .    Patient Active Problem List   Diagnosis     Acute blood loss anemia     Acute cephalic vein thrombosis, right     Acute deep vein thrombosis (DVT) of tibial vein of right lower extremity (H)     Acute respiratory failure with hypoxia (H)     Adult failure to thrive     MAMIE (acute kidney injury) (H)     Aortic dissection (H)     C. difficile colitis     CAP (community acquired pneumonia)     Chronic thoracic aortic dissection (H)     Essential hypertension     Hypertensive emergency     Metabolic encephalopathy     Methamphetamine abuse (H)     Pulmonary hemorrhage     Pulmonary hypertension (H)     Renal infarction (H)     Tobacco abuse       No Known Allergies    No current outpatient medications on file.       Social History     Tobacco Use     Smoking status: Former Smoker     Types: Cigarettes   Substance Use Topics     Alcohol use: None     Drug use: None       Shannan Javed  4/16/2021  2:09 PM  "

## 2021-04-16 NOTE — CONFIDENTIAL NOTE
Video Esophagram Review Rounds  Imaging Review of MBSS conducted with attending physician Elvie Blackburn and reviewed/discussed with SLP Apryl Barron, Shaneka Preciado, Shandra Rees and/or Mya Roldan    Relevant Background: no PEG tube anymore   MBSS Date: 4/16/21    Findings:  Pharyngeal Weakness:Yes  Epiglottic dysfunction: No  Penetration: Yes  Aspiration: Yes  UES dysfunction: No  Details: unsafe swallow with thins, penetration only with thickened liquids      Recommendations:  Diet: modified diet or alternate means of nutrition  Consider neurology work up if none done prior

## 2021-04-16 NOTE — PATIENT INSTRUCTIONS
Urine culture today.    PSA in one month.    Video visit in a couple of months.    It was a pleasure meeting with you today.  Thank you for allowing me and my team the privilege of caring for you today.  YOU are the reason we are here, and I truly hope we provided you with the excellent service you deserve.  Please let us know if there is anything else we can do for you so that we can be sure you are leaving completely satisfied with your care experience.        Kiesha Pena, CMA

## 2021-04-16 NOTE — PROGRESS NOTES
Chief Complaint:    Referred for elevated PSA           Consult or Referral:     Mr. Rico Burton is a 56 year old male seen at the request of Dr. Chavez.         History of Present Illness:     Rico Burton is a 56 year old male being seen for ELEVATED psa.  Rico lives in a group home in [place] and is not accompanied by anyone today. It was difficult to assess his issues as he is a poor historian and difficult to elucidate his history.    He seems to have some catheter related issues with possible catheter related trauma in the past and now is apparently catheter free and passing urine on his own. He says he has no issues with urination and has good control, but is here with adult diaper  which is wet.         Past Medical History:     Past Medical History:   Diagnosis Date     Acute blood loss anemia 09/07/2016    Formatting of this note might be different from the original. Transfused one unit prbc on 9/6/16.  Was having bloody secretions for several days.  No dramatic drop in hgb.     Acute cephalic vein thrombosis, right 09/07/2016    Formatting of this note might be different from the original. By doppler 8/22/16.  No change on 9/2/16 follow up.     Acute deep vein thrombosis (DVT) of tibial vein of right lower extremity (H) 09/07/2016    Formatting of this note might be different from the original. Treated with heparin for about two weeks.  Bloody pulmonary secretions increased on IV heparin.  I felt like clot limited to calf vein was low risk for pulmonary embolism.  Because of bloody secretions, bad gas exchange I stopped heparin IV on 9/5/16, and just treated with SQ heparin preventative dose.  I felt risk of PE was too low to      Acute respiratory failure with hypoxia (H) 08/17/2016     Adult failure to thrive 10/26/2020     MAMIE (acute kidney injury) (H) 08/17/2016     Alcohol abuse      Aortic dissection (H) 10/26/2020     C. difficile colitis 08/22/2016    Formatting of this note might be  different from the original. Started po flagyl 8/21/16.  Change to po vanco on 9/5/16 due to continued diarrhea.  Recommend tx with po vanco for 10 days.     CAP (community acquired pneumonia) 08/22/2016    Formatting of this note might be different from the original. S. Pneumonia in sputum on 8/17/16 with diffuse infiltrates.  Was treated with appropriate abx.     Chronic thoracic aortic dissection (H) 10/26/2020     Essential hypertension 08/17/2016     Hypertensive emergency 08/13/2016     Metabolic encephalopathy 08/17/2016     Methamphetamine abuse (H) 08/17/2016     Pulmonary hemorrhage 09/07/2016    Formatting of this note might be different from the original. Frequent bloody pulmonary secretions on suctioing.  Bronchoscopy on 9/6/16 did not show benitez central lesion to explain bleeding.     Pulmonary hypertension (H) 08/17/2016     Renal infarction (H) 08/17/2016     Tobacco abuse 08/17/2016            Past Surgical History:   No past surgical history on file.         Medications     Current Outpatient Medications   Medication     albuterol (PROAIR HFA/PROVENTIL HFA/VENTOLIN HFA) 108 (90 Base) MCG/ACT inhaler     amLODIPine (NORVASC) 5 MG tablet     Ascorbic Acid (VITAMIN C) 500 MG CAPS     cholecalciferol 25 MCG (1000 UT) TABS     famotidine (PEPCID) 20 MG tablet     ferrous sulfate (FE TABS) 325 (65 Fe) MG EC tablet     metoprolol succinate ER (TOPROL-XL) 50 MG 24 hr tablet     Multiple Vitamins-Minerals (DAILY MULTI PO)     Nutritional Supplements (NUTRITIONAL SUPPLEMENT PO)     tamsulosin (FLOMAX) 0.4 MG capsule     vitamin B-12 (CYANOCOBALAMIN) 1000 MCG tablet     No current facility-administered medications for this visit.             Family History:   No family history on file.         Social History:     Social History     Socioeconomic History     Marital status: Single     Spouse name: Not on file     Number of children: Not on file     Years of education: Not on file     Highest education level:  "Not on file   Occupational History     Not on file   Social Needs     Financial resource strain: Not on file     Food insecurity     Worry: Not on file     Inability: Not on file     Transportation needs     Medical: Not on file     Non-medical: Not on file   Tobacco Use     Smoking status: Former Smoker     Types: Cigarettes   Substance and Sexual Activity     Alcohol use: Not on file     Drug use: Not on file     Sexual activity: Not on file   Lifestyle     Physical activity     Days per week: Not on file     Minutes per session: Not on file     Stress: Not on file   Relationships     Social connections     Talks on phone: Not on file     Gets together: Not on file     Attends Mosque service: Not on file     Active member of club or organization: Not on file     Attends meetings of clubs or organizations: Not on file     Relationship status: Not on file     Intimate partner violence     Fear of current or ex partner: Not on file     Emotionally abused: Not on file     Physically abused: Not on file     Forced sexual activity: Not on file   Other Topics Concern     Not on file   Social History Narrative     Not on file            Allergies:   Patient has no known allergies.         Review of Systems:  From intake questionnaire     Skin: negative  Eyes: negative  Ears/Nose/Throat: negative  Respiratory: No shortness of breath, dyspnea on exertion, cough, or hemoptysis  Cardiovascular: No chest pain or palpitations  Gastrointestinal: negative; no nausea/vomiting, constipation or diarrhea  Genitourinary: as per HPI  Musculoskeletal: negative  Neurologic: negative  Psychiatric: negative  Hematologic/Lymphatic/Immunologic: negative  Endocrine: negative         Physical Exam:     Patient is a 56 year old  male   Vitals: Blood pressure 133/86, pulse 67, height 1.676 m (5' 6\"), weight 62.8 kg (138 lb 6.4 oz).  Constitutional: Body mass index is 22.34 kg/m .  Alert, no acute distress, oriented, conversant  Eyes: no " scleral icterus; extraocular muscles intact, moist conjunctivae  Neck: trachea midline, no thyromegaly  Ears/nose/mouth: throat/mouth:normal, good dentition  Respiratory: no respiratory distress, or pursed lip breathing  Cardiovascular: pulses strong and intact; no obvious jugular venous distension present  Gastrointestinal: soft, nontender, no organomegaly or masses,   Lymphatics: No inguinal adenopathy  Musculoskeletal: extremities normal, no peripheral edema  Skin: no suspicious lesions or rashes  Neuro: Alert, oriented, speech and mentation normal  Psych: affect and mood normal, alert and oriented to person, place and time  Gait: Normal  : penis, scrotum, testes normal, DESHAWN anodular, symmetric      Labs and Pathology:    The following labs were reviewed by me and discussed with the patient:  Creatinine: Normal  Significant for No results found for: CR  No results found for: PSA          Imaging:    The following imaging exams were independently viewed and interpreted by me and discussed with patient:    NA             Assessment and Plan:     Lower urinary tract symptoms (LUTS)  Review with these results  - PSA tumor marker; Future  - UA with Microscopic reflex to Culture; Future  - UA with Microscopic reflex to Culture          Orders  Orders Placed This Encounter   Procedures     PSA tumor marker     UA with Microscopic reflex to Culture       Darien Salguero MD  [facility] [place]      ==========================    Additional Billing and Coding Information:  Review of external notes as documented above   Review of the result(s) of each unique test - Creatinine    Independent interpretation of a test performed by another physician/other qualified health care professional (not separately reported) - NA    Discussion of management or test interpretation with external physician/other qualified healthcare professional/appropriate source - NA    Diagnosis or treatment significantly  limited by social determinants of health - Group home inhabitant with difficulty in communication.    35 minutes spent on the date of the encounter doing chart review, review of test results, interpretation of tests, patient visit and documentation     ==========================

## 2021-04-19 NOTE — PROGRESS NOTES
Speech-Language Pathology Department   EVALUATION  [facility]      04/16/21 1300   General Information   Type Of Visit Initial   Start Of Care Date 04/16/21   Referring Physician Dr. Elvie Blackburn   Orders Evaluate And Treat   Orders Comment Video Swallow Study   Medical Diagnosis Oropharyngeal dysphagia   Hearing WFL   Pertinent History of Current Problem/OT: Additional Occupational Profile Info Rico Burton is a 56-year-old man who presents today for video swallow study. PMH: alcohol abuse, methamphetamine abuse, HTN, aortic dissection, C. Diff Colitis. He appears to have had a PEG In the past but did not when he came today. He had to change into a gown and it was noted he had no PEG when he took his shirt off. He was not able to give history on what he is eating, if there are any modifications or any other personal history. The information he brought with him did not have anything about his eating/drinking and/or swallowing function.    Respiratory Status Room air   Prior Level Of Function Swallowing   Prior Level Of Function Comment Pt endorses eating and drinking. He denies having any modifications to his foods or liquids.    Living Environment Other, Comments  (Long term care)   General Observations Pt cooperative. Slow to answer questions   Patient/family Goals No goals stated   Clinical Swallow Evaluation   Oral Musculature generally intact   Structural Abnormalities none present   Dentition edentulous, does not have dentures  (has very few teeth in poor condition)   Mucosal Quality adequate   Mandibular Strength and Mobility intact   Oral Labial Strength and Mobility WFL   Lingual Strength and Mobility WFL   Velar Elevation intact   Buccal Strength and Mobility intact   Laryngeal Function Cough;Swallow;Voicing initiated   Additional evaluation(s) completed today Yes   Rationale for completing additional evaluation Video Swallow Study to further assess  pharyngeal phase and aspiration.    VFSS Eval: Radiology   Radiologist Resident, Dr. Cline   Views Taken left lateral;A/P   VFSS Eval: Thin Liquid Texture Trial   Mode of Presentation, Thin Liquid cup;self-fed   Order of Presentation Series 1   Preparatory Phase WFL   Oral Phase, Thin Liquid WFL   Pharyngeal Phase, Thin Liquid Delayed swallow reflex   Rosenbek's Penetration Aspiration Scale: Thin Liquid Trial Results 7 - contrast passes glottis, visible subglottic residue remains despite patient's response (aspiration)   Diagnostic Statement Delayed initiation of the swallow which results in aspiration before and during the swallow. No pharyngeal residue remains after the completed swallow.    VFSS Eval: Nectar Thick Liquid Texture Trial   Mode of Presentation, Nectar cup;self-fed   Order of Presentation Series 2, 3, 12 (A/P)    Preparatory Phase WFL   Oral Phase, Nectar WFL   Pharyngeal Phase, Nectar Delayed swallow reflex   Rosenbek's Penetration Aspiration Scale: Nectar-Thick Liquid Trial Results 7 - contrast passes glottis, visible subglottic residue remains despite patient's response (aspiration)   Diagnostic Statement Aspiration noted on nectar thick liquid trials. Amount of aspiration was variable however the cough response did not clear the material from the airway.   VFSS Eval: Honey Thick Liquid Texture Trial   Mode of Presentation, Honey cup;self-fed   Order of Presentation Series 4, 5, 10   Preparatory Phase WFL   Oral Phase, Honey Premature pharyngeal entry   Pharyngeal Phase, Honey WFL   Rosenbek's Penetration Aspiration Scale: Honey Trial Results 2 - contrast enters airway, remains above the vocal cords, no residue remains (penetration)   Diagnostic Statement Flash penetration noted on honey thick liquid trial. All material was cleared from the airway with the force of the swallow.    VFSS Eval: Puree Solid Texture Trial   Mode of Presentation, Puree spoon;self-fed   Order of Presentation Series 6, 7,  11 (A/P)   Preparatory Phase WFL   Oral Phase, Puree Premature pharyngeal entry   Pharyngeal Phase, Puree Delayed swallow reflex   Rosenbek's Penetration Aspiration Scale: Puree Food Trial Results 1 - no aspiration, contrast does not enter airway   Diagnostic Statement No aspiration/penetration noted on puree consistency trials.    VFSS Eval: Solid Food Texture Trial   Mode of Presentation, Solid self-fed   Order of Presentation Cookie: Series 8; Barium tablet with applesauce Series 9   Preparatory Phase Poor bolus control   Oral Phase, Solid Premature pharyngeal entry   Pharyngeal Phase, Solid Delayed swallow reflex   Rosenbek's Penetration Aspiration Scale: Solid Food Trial Results 1 - no aspiration, contrast does not enter airway   Diagnostic Statement No aspiration/penetration noted on solid consistency trial. Pt does demonstrate decreased attention to task with premature spillage into the pharynx and delayed initiation of the swallow. No residue noted after the completed swallow.    Swallow Compensations   Swallow Compensations No compensations were used   Educational Assessment   Barriers to Learning Cognitive   Esophageal Phase of Swallow   Esophageal comments Unable to assess esophageal phase due to severity of aspiration   Swallow Eval: Clinical Impressions   Skilled Criteria for Therapy Intervention Current level of function same as previous level of function   Dysphagia Outcome Severity Scale (SILAS) Level 2 - SILAS   Treatment Diagnosis Moderately Severe Oropharygeal Dysphagia   Diet texture recommendations Dysphagia diet level 2;Honey thick liquids   Recommended Feeding/Eating Techniques alternate between small bites and sips of food/liquid;hard swallow w/ each bite or sip;maintain upright posture during/after eating for 30 mins;small sips/bites   Predicted Duration of Therapy Intervention (days/wks) Evaluation only   Risks and Benefits of Treatment have been explained. Yes   Patient, family and/or staff  in agreement with Plan of Care Yes   Clinical Impression Comments Rico Burton demonstrates moderately severe oropharyngeal dysphagia as characterized by aspiration on thin and nectar thick liquids which is not cleared with cued cough. He demonstrates premature spillage of thin and nectar thick liquid which move toward the pharynx and into the laryngeal vestibule causing aspiration prior to the swallow. The amount of aspiration is small but does increase over time and is not cleared from airway after cued coughing. No pharyngeal residue noted after the completed swallow. Rico is unable to follow directions or maintain any safe swallow strategies due to cognitive dysfunction. He demonstrates penetration on honey thick liquid trials. No aspiration or penetration noted on puree or solid consistency trials. He has limited dentition for mastication of solids so this was significantly prolonged. No oral stasis after the completed swallow demonstrated. Unfortunately, Rico's PEG seems to have already been pulled out and due to his high risk for aspiration pneumonia, he would benefit from honey thick liquids and soft moist solids. Pills whole in puree. Sit upright during po intake and limit distractions to optimize safe swallowing.   Total Session Time   SLP Eval: oral/pharyngeal swallow function, clinical minutes (73260) 12   SLP Eval: VideoFluoroscopic Swallow function Minutes (36258) 13   Total Evaluation Time 30       Thank you for the referral of Rico Burton. If you have any questions about this report, please contact me using the information below.      Apryl Barron MS, CCC-SLP  Speech-Language Pathology  [facility]  Department of Otolaryngology/D&T - 4th floor  Pager: [phone]  Phone: [phone]  Email: [email or web address]

## 2021-04-28 ENCOUNTER — TELEPHONE (OUTPATIENT)
Dept: OTOLARYNGOLOGY | Facility: CLINIC | Age: 56
End: 2021-04-28

## 2021-04-28 NOTE — TELEPHONE ENCOUNTER
Left vm for patient's sister to request she join the appointment tomorrow virtually. If she is unable, wondering if another family member may be able to join. Encouraged to call back to confirm.    Jocelyn Coburn RN on 4/28/2021 at 2:28 PM

## 2021-04-28 NOTE — PROGRESS NOTES
[facility]   at the [facility]   Otolaryngology Clinic     Patient: Rico Burton    MRN: [id number]    : 1965    Age/Gender: 56 year old male  Date of Service: 2021  Rendering Provider:   Elvie Blackburn MD      Referring Provider   PCP: No primary care provider on file.  Referring Physician: Jak Chavez MD  No address on file  Reason for Consultation   Dysphagia  History of Present Illness   HISTORY OF PRESENT ILLNESS: Mr. Burton is a 56 year old male is here for evaluation of dysphagia.       Of note, he has a history of type B aortic dissection in 2016 treated with medical management, medication noncompliance, methamphetamine and polysubstance abuse. Patient had a PEG placed years ago. He is currently residing at [place], [facility]. He          Today, he presents for follow up. he reports:  - here with his brother Juventino on the telephone  - the family was not aware he does not have a feeding tube  - he is being fed regular soft textures with honey thick liquids  - poor dental hygiene  - lives in a group home since 2020     PAST MEDICAL HISTORY:   Past Medical History:   Diagnosis Date     Acute blood loss anemia 2016    Formatting of this note might be different from the original. Transfused one unit prbc on 16.  Was having bloody secretions for several days.  No dramatic drop in hgb.     Acute cephalic vein thrombosis, right 2016    Formatting of this note might be different from the original. By doppler 16.  No change on 16 follow up.     Acute deep vein thrombosis (DVT) of tibial vein of right lower extremity (H) 2016    Formatting of this note might be different from the original. Treated with heparin for about two weeks.  Bloody pulmonary secretions increased on IV heparin.  I felt like clot limited to calf vein was low risk for pulmonary embolism.  Because of bloody secretions, bad gas exchange I stopped  heparin IV on 9/5/16, and just treated with SQ heparin preventative dose.  I felt risk of PE was too low to      Acute respiratory failure with hypoxia (H) 08/17/2016     Adult failure to thrive 10/26/2020     MAMIE (acute kidney injury) (H) 08/17/2016     Alcohol abuse      Aortic dissection (H) 10/26/2020     C. difficile colitis 08/22/2016    Formatting of this note might be different from the original. Started po flagyl 8/21/16.  Change to po vanco on 9/5/16 due to continued diarrhea.  Recommend tx with po vanco for 10 days.     CAP (community acquired pneumonia) 08/22/2016    Formatting of this note might be different from the original. S. Pneumonia in sputum on 8/17/16 with diffuse infiltrates.  Was treated with appropriate abx.     Chronic thoracic aortic dissection (H) 10/26/2020     Essential hypertension 08/17/2016     Hypertensive emergency 08/13/2016     Metabolic encephalopathy 08/17/2016     Methamphetamine abuse (H) 08/17/2016     Pulmonary hemorrhage 09/07/2016    Formatting of this note might be different from the original. Frequent bloody pulmonary secretions on suctioing.  Bronchoscopy on 9/6/16 did not show benitez central lesion to explain bleeding.     Pulmonary hypertension (H) 08/17/2016     Renal infarction (H) 08/17/2016     Tobacco abuse 08/17/2016       PAST SURGICAL HISTORY: No past surgical history on file.    CURRENT MEDICATIONS:   Current Outpatient Medications:      albuterol (PROAIR HFA/PROVENTIL HFA/VENTOLIN HFA) 108 (90 Base) MCG/ACT inhaler, , Disp: , Rfl:      amLODIPine (NORVASC) 5 MG tablet, Take 5 mg by mouth, Disp: , Rfl:      Ascorbic Acid (VITAMIN C) 500 MG CAPS, , Disp: , Rfl:      cholecalciferol 25 MCG (1000 UT) TABS, Take 2,000 Units by mouth, Disp: , Rfl:      famotidine (PEPCID) 20 MG tablet, Take 20 mg by mouth, Disp: , Rfl:      ferrous sulfate (FE TABS) 325 (65 Fe) MG EC tablet, Take 325 mg by mouth daily, Disp: , Rfl:      metoprolol succinate ER (TOPROL-XL) 50 MG 24  hr tablet, Take 50 mg by mouth, Disp: , Rfl:      Multiple Vitamins-Minerals (DAILY MULTI PO), , Disp: , Rfl:      Nutritional Supplements (NUTRITIONAL SUPPLEMENT PO), , Disp: , Rfl:      tamsulosin (FLOMAX) 0.4 MG capsule, , Disp: , Rfl:      vitamin B-12 (CYANOCOBALAMIN) 1000 MCG tablet, Take 1,000 mcg by mouth daily, Disp: , Rfl:     ALLERGIES: Patient has no known allergies.    SOCIAL HISTORY:    Social History     Socioeconomic History     Marital status: Single     Spouse name: Not on file     Number of children: Not on file     Years of education: Not on file     Highest education level: Not on file   Occupational History     Not on file   Social Needs     Financial resource strain: Not on file     Food insecurity     Worry: Not on file     Inability: Not on file     Transportation needs     Medical: Not on file     Non-medical: Not on file   Tobacco Use     Smoking status: Former Smoker     Types: Cigarettes   Substance and Sexual Activity     Alcohol use: Not on file     Drug use: Not on file     Sexual activity: Not on file   Lifestyle     Physical activity     Days per week: Not on file     Minutes per session: Not on file     Stress: Not on file   Relationships     Social connections     Talks on phone: Not on file     Gets together: Not on file     Attends Worship service: Not on file     Active member of club or organization: Not on file     Attends meetings of clubs or organizations: Not on file     Relationship status: Not on file     Intimate partner violence     Fear of current or ex partner: Not on file     Emotionally abused: Not on file     Physically abused: Not on file     Forced sexual activity: Not on file   Other Topics Concern     Not on file   Social History Narrative     Not on file         FAMILY HISTORY: No family history on file.   Non-contributory for problems with anesthesia    REVIEW OF SYSTEMS:   The patient was asked a 14 point review of systems regarding constitutional  symptoms, eye symptoms, ears, nose, mouth, throat symptoms, cardiovascular symptoms, respiratory symptoms, gastrointestinal symptoms, genitourinary symptoms, musculoskeletal symptoms, integumentary symptoms, neurological symptoms, psychiatric symptoms, endocrine symptoms, hematologic/lymphatic symptoms, and allergic/ immunologic symptoms.   The pertinent factors have been included in the HPI and below.  Patient Supplied Answers to Review of Systems  No flowsheet data found.    Physical Examination   The patient underwent a physical examination as described below. The pertinent positive and negative findings are summarized after the description of the examination.  Constitutional: The patient's developmental and nutritional status was assessed. The patient's voice quality was assessed.  Head and Face: The head and face were inspected for deformities. The sinuses were palpated. The salivary glands were palpated. Facial muscle strength was assessed bilaterally.  Eyes: Extraocular movements and primary gaze alignment were assessed.  Ears, Nose, Mouth and Throat: The ears and nose were examined for deformities. The nasal septum, mucosa, and turbinates were inspected by anterior rhinoscopy. The lips, teeth, and gums were examined for abnormalities. The oral mucosa, tongue, palate, tonsils, lateral and posterior pharynx were inspected for the presence of asymmetry or mucosal lesions.    Neck: The tracheal position was noted, and the neck mass palpated to determine if there were any asymmetries, abnormal neck masses, thyromegally, or thyroid nodules.  Respiratory: The nature of the breathing and chest expansion/symmetry was observed.  Cardiovascular: The patient was examined to determine the presence of any edema or jugular venous distension.  Abdomen: The contour of the abdomen was noted.  Lymphatic: The patient was examined for infraclavicular lymphadenopathy.  Musculoskeletal: The patient was inspected for the presence  of skeletal deformities.  Extremities: The extremities were examined for any clubbing or cyanosis.  Skin: The skin was examined for inflammatory or neoplastic conditions.  Neurologic: The patient's orientation, mood, and affect were noted. The cranial nerve  functions were examined.  Other pertinent positive and negative findings on physical examination:   OC/OP: no lesions, uvula midline, soft palate elevates symmetrically  Neck: no lesions, no TH tenderness to palpation    All other physical examination findings were within normal limits and noncontributory.    Procedures   Flexible laryngoscopy (CPT 35004)      Pre-procedure diagnosis: throat discomfort  Post-procedure diagnosis: same as above  Indication for procedure: Mr. Burton is a 56 year old male with see above  Procedure(s): Fiberoptic Laryngoscopy    Details of Procedure: After informed consent was obtained, the patient was seated in the examination chair.  The areas of the nasopharynx as well as the hypopharynx were anesthetized with topical 4% lidocaine with 0.25% phenylephrine atomizer.  Examination of the base of tongue was performed first.  Attention was directed to any evidence of masses in the area or evidence of leukoplakia or candidal infection.  Attention was directed to the epiglottis where its size and position was determined and its movement on phonation of the vowel  e .  The piriform sinuses were then inspected for any mass lesions or pooling of secretions.  Attention was then directed to the larynx. The vocal folds were inspected for infection or any areas of leukoplakia, for masses, polypoid degeneration, or hemorrhage.  Having done this, the arytenoids and vocal processes were inspected for erythema or evidence of granuloma formation.  The posterior commissure was then inspected for evidence of inflammatory changes in the mucosa and heaping up of mucosal tissue. The patient was then instructed to say the vowel  e .  Adduction of vocal  folds to the midline was observed for any evidence of paresis or paralysis of the larynx or asymmetry in rotation of the larynx to the left or right. The patient was asked to breathe and the degree of abduction was noted bilaterally.  Subglottic view of the larynx was obtained for any additional mass lesions or mucosal changes.  Finally the post cricoid was examined for evidence of pooling of secretions, as well as the pharyngeal wall mucosa.   Anesthesia type: 0.25% phenylephrine    Findings:  Anatomic/physiological deviations: normal vocal fold mobility   Right vocal process: No restriction of mobility   Left vocal process: No restriction of mobility  Glottal gap: Complete glottal closure  Supraglottic structures: Normal  Hypopharynx: Normal     Estimated Blood Loss: minimal  Complications: None  Disposition: Patient tolerated the procedure well            Fiberoptic Endoscopic Evaluation of Swallowing (CPT 96968)  and Interpretation of Swallowing (CPT 02864)    Indications: See above notes for complete history and physical. Patient complains of dysphagia to both solids and liquids and/or there is suggestion on history and endoscopic exam of the presence of dysphagia causing medical complaints.  Swallowing evaluation is being performed to assess the presence and degree of dysphagia, and to recommend a safe diet.     Pulmonary Status:  No PNA   Current Diet:              soft                                        thick liquids      Consistency Amounts:  Thin Liquid: sip   Puree: teaspoon  Solid: cookies            Positioning: upright in a chair  Oral Peripheral Exam: See physical exam section.  Anatomic Notes: See Videostroboscopy report for assessment of anatomy and laryngeal functioning  Pharyngeal secretions prior to administration of liquid or food: No  Oral Phase Abnormal Findings: No abnormal behavior observed  Behavioral Adaptations: No abnormal behavior observed  Pharyngeal Phase Abnormal Findings:  aspiration with thin liquids    Recommended Diet:  soft                                        thick liquids                 Review of Relevant Clinical Data   Notes:  10/30/20 mbss reviewed with pharyngeal weakness, slow oral phase and aspiration/penetration with thins - just the report reviewed under the admission encounter   Video Esophagram Review Rounds  Imaging Review of MBSS conducted with attending physician Elvie Blackburn and reviewed/discussed with SLP Apryl Barron, Shaneka Preciado, Shandra Rees and/or May Roldan     Relevant Background: no PEG tube anymore   MBSS Date: 21     Findings:  Pharyngeal Weakness:Yes  Epiglottic dysfunction: No  Penetration: Yes  Aspiration: Yes  UES dysfunction: No  Details: unsafe swallow with thins, penetration only with thickened liquids        Recommendations:  Diet: modified diet or alternate means of nutrition  Consider neurology work up if none done prior               Labs:  No results found for: TSH  No results found for: NA, CO2, BUN, CREAT, GLUCOSE, PHOS  No results found for: WBC, HGB, HCT, MCV, PLT  No results found for: PT, PTT, INR  No results found for: YANELI  No components found for: RHEUMATOIDFACTOR,  RF  No results found for: CRP  No components found for: CKTOT, URICACID  No components found for: C3, C4, DSDNAAB, NDNAABIFA  No results found for: MPOAB    Patient reported Quality of Life (QOL) Measures   Patient Supplied Answers To VHI Questionnaire  No flowsheet data found.      Patient Supplied Answers To EAT Questionnaire  No flowsheet data found.      Patient Supplied Answers To CSI Questionnaire  No flowsheet data found.      Patient Supplied Answers to Dyspnea Index Questionnaire:  No flowsheet data found.    Impression & Plan     IMPRESSION: Mr. Burton is a 56 year old male who is being seen for the followin. Dysphagia  - 10/30/20 mbss reviewed with pharyngeal weakness, slow oral phase and aspiration/penetration with thins - just  the report reviewed under the admission encounter  - Xray Video Swallow Exam 4/16/21 - unsafe swallow with thins, penetration only with thickened liquids  modified diet or alternate means of nutrition  - scope shows normal vocal fold motion  - FEES shows aspiration with thin liquids  - discussed wit him and brother about need for modified diet, since no pneumonias can continue food by mouth   Plan  - modified diet per speech and language therapy instructions   - dental hygiene  - return if develops pneumonia        RETURN VISIT: as needed    Elvie Blackburn MD    Laryngology    [facility]  Department of  Otolaryngology - Head and Neck Surgery  Clinics & Surgery Center  [address] [place] [address]  Appointment line: [phone]  Fax: [phone]  [email or web address]

## 2021-04-29 ENCOUNTER — THERAPY VISIT (OUTPATIENT)
Dept: SPEECH THERAPY | Facility: CLINIC | Age: 56
End: 2021-04-29
Payer: COMMERCIAL

## 2021-04-29 ENCOUNTER — OFFICE VISIT (OUTPATIENT)
Dept: OTOLARYNGOLOGY | Facility: CLINIC | Age: 56
End: 2021-04-29
Payer: COMMERCIAL

## 2021-04-29 VITALS
WEIGHT: 138 LBS | OXYGEN SATURATION: 95 % | TEMPERATURE: 98.8 F | BODY MASS INDEX: 22.18 KG/M2 | HEIGHT: 66 IN | HEART RATE: 59 BPM

## 2021-04-29 DIAGNOSIS — R13.12 OROPHARYNGEAL DYSPHAGIA: Primary | ICD-10-CM

## 2021-04-29 PROCEDURE — 92612 ENDOSCOPY SWALLOW (FEES) VID: CPT | Mod: GN | Performed by: OTOLARYNGOLOGY

## 2021-04-29 PROCEDURE — 92613 ENDOSCOPY SWALLOW (FEES) I&R: CPT | Performed by: OTOLARYNGOLOGY

## 2021-04-29 PROCEDURE — 99203 OFFICE O/P NEW LOW 30 MIN: CPT | Mod: 25 | Performed by: OTOLARYNGOLOGY

## 2021-04-29 PROCEDURE — 92610 EVALUATE SWALLOWING FUNCTION: CPT | Mod: GN | Performed by: SPEECH-LANGUAGE PATHOLOGIST

## 2021-04-29 ASSESSMENT — PAIN SCALES - GENERAL: PAINLEVEL: NO PAIN (0)

## 2021-04-29 ASSESSMENT — MIFFLIN-ST. JEOR: SCORE: 1388.71

## 2021-04-29 NOTE — LETTER
2021       RE: Rico Burton  [address] [facility] [address]     Dear Colleague,    Thank you for referring your patient, Rico Burton, to the [facility] EAR NOSE AND TH[facility] [place] at [facility]. Please see a copy of my visit note below.        [facility]   at the [facility]   Otolaryngology Clinic     Patient: Rico Burton    MRN: [id number]    : 1965    Age/Gender: 56 year old male  Date of Service: 2021  Rendering Provider:   Elvie Blackburn MD      Referring Provider   PCP: No primary care provider on file.  Referring Physician: Jak Chavez MD  No address on file  Reason for Consultation   Dysphagia  History of Present Illness   HISTORY OF PRESENT ILLNESS: Mr. Burton is a 56 year old male is here for evaluation of dysphagia.       Of note, he has a history of type B aortic dissection in 2016 treated with medical management, medication noncompliance, methamphetamine and polysubstance abuse. Patient had a PEG placed years ago. He is currently residing at [place], [facility]. He          Today, he presents for follow up. he reports:  - here with his brother Juventino on the telephone  - the family was not aware he does not have a feeding tube  - he is being fed regular soft textures with honey thick liquids  - poor dental hygiene  - lives in a group home since 2020     PAST MEDICAL HISTORY:   Past Medical History:   Diagnosis Date     Acute blood loss anemia 2016    Formatting of this note might be different from the original. Transfused one unit prbc on 16.  Was having bloody secretions for several days.  No dramatic drop in hgb.     Acute cephalic vein thrombosis, right 2016    Formatting of this note might be different from the original. By doppler 16.  No change on 16 follow up.     Acute deep vein thrombosis (DVT) of tibial vein of right  lower extremity (H) 09/07/2016    Formatting of this note might be different from the original. Treated with heparin for about two weeks.  Bloody pulmonary secretions increased on IV heparin.  I felt like clot limited to calf vein was low risk for pulmonary embolism.  Because of bloody secretions, bad gas exchange I stopped heparin IV on 9/5/16, and just treated with SQ heparin preventative dose.  I felt risk of PE was too low to      Acute respiratory failure with hypoxia (H) 08/17/2016     Adult failure to thrive 10/26/2020     MAMIE (acute kidney injury) (H) 08/17/2016     Alcohol abuse      Aortic dissection (H) 10/26/2020     C. difficile colitis 08/22/2016    Formatting of this note might be different from the original. Started po flagyl 8/21/16.  Change to po vanco on 9/5/16 due to continued diarrhea.  Recommend tx with po vanco for 10 days.     CAP (community acquired pneumonia) 08/22/2016    Formatting of this note might be different from the original. S. Pneumonia in sputum on 8/17/16 with diffuse infiltrates.  Was treated with appropriate abx.     Chronic thoracic aortic dissection (H) 10/26/2020     Essential hypertension 08/17/2016     Hypertensive emergency 08/13/2016     Metabolic encephalopathy 08/17/2016     Methamphetamine abuse (H) 08/17/2016     Pulmonary hemorrhage 09/07/2016    Formatting of this note might be different from the original. Frequent bloody pulmonary secretions on suctioing.  Bronchoscopy on 9/6/16 did not show benitez central lesion to explain bleeding.     Pulmonary hypertension (H) 08/17/2016     Renal infarction (H) 08/17/2016     Tobacco abuse 08/17/2016       PAST SURGICAL HISTORY: No past surgical history on file.    CURRENT MEDICATIONS:   Current Outpatient Medications:      albuterol (PROAIR HFA/PROVENTIL HFA/VENTOLIN HFA) 108 (90 Base) MCG/ACT inhaler, , Disp: , Rfl:      amLODIPine (NORVASC) 5 MG tablet, Take 5 mg by mouth, Disp: , Rfl:      Ascorbic Acid (VITAMIN C) 500 MG  CAPS, , Disp: , Rfl:      cholecalciferol 25 MCG (1000 UT) TABS, Take 2,000 Units by mouth, Disp: , Rfl:      famotidine (PEPCID) 20 MG tablet, Take 20 mg by mouth, Disp: , Rfl:      ferrous sulfate (FE TABS) 325 (65 Fe) MG EC tablet, Take 325 mg by mouth daily, Disp: , Rfl:      metoprolol succinate ER (TOPROL-XL) 50 MG 24 hr tablet, Take 50 mg by mouth, Disp: , Rfl:      Multiple Vitamins-Minerals (DAILY MULTI PO), , Disp: , Rfl:      Nutritional Supplements (NUTRITIONAL SUPPLEMENT PO), , Disp: , Rfl:      tamsulosin (FLOMAX) 0.4 MG capsule, , Disp: , Rfl:      vitamin B-12 (CYANOCOBALAMIN) 1000 MCG tablet, Take 1,000 mcg by mouth daily, Disp: , Rfl:     ALLERGIES: Patient has no known allergies.    SOCIAL HISTORY:    Social History     Socioeconomic History     Marital status: Single     Spouse name: Not on file     Number of children: Not on file     Years of education: Not on file     Highest education level: Not on file   Occupational History     Not on file   Social Needs     Financial resource strain: Not on file     Food insecurity     Worry: Not on file     Inability: Not on file     Transportation needs     Medical: Not on file     Non-medical: Not on file   Tobacco Use     Smoking status: Former Smoker     Types: Cigarettes   Substance and Sexual Activity     Alcohol use: Not on file     Drug use: Not on file     Sexual activity: Not on file   Lifestyle     Physical activity     Days per week: Not on file     Minutes per session: Not on file     Stress: Not on file   Relationships     Social connections     Talks on phone: Not on file     Gets together: Not on file     Attends Religion service: Not on file     Active member of club or organization: Not on file     Attends meetings of clubs or organizations: Not on file     Relationship status: Not on file     Intimate partner violence     Fear of current or ex partner: Not on file     Emotionally abused: Not on file     Physically abused: Not on file      Forced sexual activity: Not on file   Other Topics Concern     Not on file   Social History Narrative     Not on file         FAMILY HISTORY: No family history on file.   Non-contributory for problems with anesthesia    REVIEW OF SYSTEMS:   The patient was asked a 14 point review of systems regarding constitutional symptoms, eye symptoms, ears, nose, mouth, throat symptoms, cardiovascular symptoms, respiratory symptoms, gastrointestinal symptoms, genitourinary symptoms, musculoskeletal symptoms, integumentary symptoms, neurological symptoms, psychiatric symptoms, endocrine symptoms, hematologic/lymphatic symptoms, and allergic/ immunologic symptoms.   The pertinent factors have been included in the HPI and below.  Patient Supplied Answers to Review of Systems  No flowsheet data found.    Physical Examination   The patient underwent a physical examination as described below. The pertinent positive and negative findings are summarized after the description of the examination.  Constitutional: The patient's developmental and nutritional status was assessed. The patient's voice quality was assessed.  Head and Face: The head and face were inspected for deformities. The sinuses were palpated. The salivary glands were palpated. Facial muscle strength was assessed bilaterally.  Eyes: Extraocular movements and primary gaze alignment were assessed.  Ears, Nose, Mouth and Throat: The ears and nose were examined for deformities. The nasal septum, mucosa, and turbinates were inspected by anterior rhinoscopy. The lips, teeth, and gums were examined for abnormalities. The oral mucosa, tongue, palate, tonsils, lateral and posterior pharynx were inspected for the presence of asymmetry or mucosal lesions.    Neck: The tracheal position was noted, and the neck mass palpated to determine if there were any asymmetries, abnormal neck masses, thyromegally, or thyroid nodules.  Respiratory: The nature of the breathing and chest  expansion/symmetry was observed.  Cardiovascular: The patient was examined to determine the presence of any edema or jugular venous distension.  Abdomen: The contour of the abdomen was noted.  Lymphatic: The patient was examined for infraclavicular lymphadenopathy.  Musculoskeletal: The patient was inspected for the presence of skeletal deformities.  Extremities: The extremities were examined for any clubbing or cyanosis.  Skin: The skin was examined for inflammatory or neoplastic conditions.  Neurologic: The patient's orientation, mood, and affect were noted. The cranial nerve  functions were examined.  Other pertinent positive and negative findings on physical examination:   OC/OP: no lesions, uvula midline, soft palate elevates symmetrically  Neck: no lesions, no TH tenderness to palpation    All other physical examination findings were within normal limits and noncontributory.    Procedures   Flexible laryngoscopy (CPT 71435)      Pre-procedure diagnosis: throat discomfort  Post-procedure diagnosis: same as above  Indication for procedure: Mr. Burton is a 56 year old male with see above  Procedure(s): Fiberoptic Laryngoscopy    Details of Procedure: After informed consent was obtained, the patient was seated in the examination chair.  The areas of the nasopharynx as well as the hypopharynx were anesthetized with topical 4% lidocaine with 0.25% phenylephrine atomizer.  Examination of the base of tongue was performed first.  Attention was directed to any evidence of masses in the area or evidence of leukoplakia or candidal infection.  Attention was directed to the epiglottis where its size and position was determined and its movement on phonation of the vowel  e .  The piriform sinuses were then inspected for any mass lesions or pooling of secretions.  Attention was then directed to the larynx. The vocal folds were inspected for infection or any areas of leukoplakia, for masses, polypoid degeneration, or  hemorrhage.  Having done this, the arytenoids and vocal processes were inspected for erythema or evidence of granuloma formation.  The posterior commissure was then inspected for evidence of inflammatory changes in the mucosa and heaping up of mucosal tissue. The patient was then instructed to say the vowel  e .  Adduction of vocal folds to the midline was observed for any evidence of paresis or paralysis of the larynx or asymmetry in rotation of the larynx to the left or right. The patient was asked to breathe and the degree of abduction was noted bilaterally.  Subglottic view of the larynx was obtained for any additional mass lesions or mucosal changes.  Finally the post cricoid was examined for evidence of pooling of secretions, as well as the pharyngeal wall mucosa.   Anesthesia type: 0.25% phenylephrine    Findings:  Anatomic/physiological deviations: normal vocal fold mobility   Right vocal process: No restriction of mobility   Left vocal process: No restriction of mobility  Glottal gap: Complete glottal closure  Supraglottic structures: Normal  Hypopharynx: Normal     Estimated Blood Loss: minimal  Complications: None  Disposition: Patient tolerated the procedure well            Fiberoptic Endoscopic Evaluation of Swallowing (CPT 72314)  and Interpretation of Swallowing (CPT 12571)    Indications: See above notes for complete history and physical. Patient complains of dysphagia to both solids and liquids and/or there is suggestion on history and endoscopic exam of the presence of dysphagia causing medical complaints.  Swallowing evaluation is being performed to assess the presence and degree of dysphagia, and to recommend a safe diet.     Pulmonary Status:  No PNA   Current Diet:              soft                                        thick liquids      Consistency Amounts:  Thin Liquid: sip   Puree: teaspoon  Solid: cookies            Positioning: upright in a chair  Oral Peripheral Exam: See physical exam  section.  Anatomic Notes: See Videostroboscopy report for assessment of anatomy and laryngeal functioning  Pharyngeal secretions prior to administration of liquid or food: No  Oral Phase Abnormal Findings: No abnormal behavior observed  Behavioral Adaptations: No abnormal behavior observed  Pharyngeal Phase Abnormal Findings: aspiration with thin liquids    Recommended Diet:  soft                                        thick liquids                 Review of Relevant Clinical Data   Notes:  10/30/20 mbss reviewed with pharyngeal weakness, slow oral phase and aspiration/penetration with thins - just the report reviewed under the admission encounter   Video Esophagram Review Rounds  Imaging Review of MBSS conducted with attending physician Elvie Blackburn and reviewed/discussed with SLP Apryl Barron, Shaneka Preciado, Shandra Rees and/or May Roldan     Relevant Background: no PEG tube anymore   MBSS Date: 4/16/21     Findings:  Pharyngeal Weakness:Yes  Epiglottic dysfunction: No  Penetration: Yes  Aspiration: Yes  UES dysfunction: No  Details: unsafe swallow with thins, penetration only with thickened liquids        Recommendations:  Diet: modified diet or alternate means of nutrition  Consider neurology work up if none done prior               Labs:  No results found for: TSH  No results found for: NA, CO2, BUN, CREAT, GLUCOSE, PHOS  No results found for: WBC, HGB, HCT, MCV, PLT  No results found for: PT, PTT, INR  No results found for: YANELI  No components found for: RHEUMATOIDFACTOR,  RF  No results found for: CRP  No components found for: CKTOT, URICACID  No components found for: C3, C4, DSDNAAB, NDNAABIFA  No results found for: MPOAB    Patient reported Quality of Life (QOL) Measures   Patient Supplied Answers To VHI Questionnaire  No flowsheet data found.      Patient Supplied Answers To EAT Questionnaire  No flowsheet data found.      Patient Supplied Answers To CSI Questionnaire  No flowsheet data  found.      Patient Supplied Answers to Dyspnea Index Questionnaire:  No flowsheet data found.    Impression & Plan     IMPRESSION: Mr. Burton is a 56 year old male who is being seen for the followin. Dysphagia  - 10/30/20 mbss reviewed with pharyngeal weakness, slow oral phase and aspiration/penetration with thins - just the report reviewed under the admission encounter  - Xray Video Swallow Exam 21 - unsafe swallow with thins, penetration only with thickened liquids  modified diet or alternate means of nutrition  - scope shows normal vocal fold motion  - FEES shows aspiration with thin liquids  - discussed wit him and brother about need for modified diet, since no pneumonias can continue food by mouth   Plan  - modified diet per speech and language therapy instructions   - dental hygiene  - return if develops pneumonia        RETURN VISIT: as needed    Elvie Blackburn MD    Laryngology    [facility]  Department of  Otolaryngology - Head and Neck Surgery  Clinics & Surgery Center  [address] [place] [id number]  Appointment line: [phone]  Fax: [phone]  [email or web address]

## 2021-04-29 NOTE — PATIENT INSTRUCTIONS
1.  You were seen in the ENT Clinic today by . If you have any questions or concerns after your appointment, please call [phone]. Press option #1 for scheduling related needs. Press option #3 for Nurse advice.    2.   has recommended  the following:   - see instructions for safe diet from May.    3.  Plan is to return to clinic as needed or sooner with new or worsening symptoms       Sol Regalado LPN  [phone]  [facility] - Otolaryngology

## 2021-04-29 NOTE — DISCHARGE INSTRUCTIONS
Continue with soft, moist solids (Dysphagia Diet Level 2) and Honey Thick Liquids    Please brush teeth, tongue and gums prior to PO intake    Encourage pt to follow safe swallow strategies:  -small sips  -small bites  -alternate solids and liquids  -slow pace  -sit upright for all PO intake

## 2021-05-04 ENCOUNTER — RECORDS - HEALTHEAST (OUTPATIENT)
Dept: LAB | Facility: CLINIC | Age: 56
End: 2021-05-04

## 2021-05-04 NOTE — PROGRESS NOTES
Speech-Language Pathology Department   EV[facility]  [facility]  Clinical Swallow Evaluation Under Endoscopy Completed by MD    04/29/21 3223   General Information   Type Of Visit Initial   Start Of Care Date 04/29/21   Referring Physician Dr. Elvie Blackburn   Orders Evaluate And Treat   Orders Comment Clinical Swallow Evaluation   Medical Diagnosis Oropharyngeal dysphagia   Hearing Functional in 1:1 setting   Pertinent History of Current Problem/OT: Additional Occupational Profile Info Rico Burton is a 56-year-old man with PMH significant for alcohol abuse, methamphetamine abuse, HTN, aortic dissection, C. Diff Colitis. He appears to have had a PEG in the past; however, the pt and his brother who joins today via telephone are unsure when the PEG came out. Per paperwork sent from the care facility, pt's diet is soft moist solids and honey thick liquids. Pt is seen today in conjunction with ENT clinic visit per MD request.    Respiratory Status Room air   Prior Level Of Function Swallowing   Prior Level Of Function Comment Care facility paperwork states he is eating soft solids and honey thick liquids   Living Environment Other, Comments  (long term care facility)   General Observations Pt generally cooperative. Difficulty following commands and answering questions   Patient/family Goals To determine candidacy for diet advancement   Clinical Swallow Evaluation   Oral Musculature generally intact   Structural Abnormalities none present   Dentition other (see comments)  (very few teeth and in poor condition)   Mucosal Quality adequate   Mandibular Strength and Mobility intact   Oral Labial Strength and Mobility WFL   Lingual Strength and Mobility WFL   Velar Elevation intact   Buccal Strength and Mobility intact   Laryngeal Function Cough;Swallow;Voicing initiated   Additional Documentation Yes   Clinical Swallow Eval: Thin Liquid Texture Trial   Mode of Presentation,  Thin Liquids fed by clinician;spoon   Volume of Liquid or Food Presented 1 tablespoon   Oral Phase of Swallow Premature pharyngeal entry;other (see comments)  (vs delay in swallow response)   Pharyngeal Phase of Swallow other (see comments)  (silent aspiration)   Diagnostic Statement PO trials evaluated under endoscopy completed by MD. Delayed swallow response. Premature spillage to the piriforms and into laryngeal vestibule. Silent aspiration with residuals on the cords observed after the swallow. No reflexive response.   Clinical Swallow Eval: Honey-Thick Liquid Texture Trial   Mode of Presentation, Honey spoon   Volume of Honey Presented 1 tablespoon   Oral Phase, Honey Premature pharyngeal entry   Pharyngeal Phase, Honey intact   Diagnostic Statement PO trials evaluated under endoscopy completed by MD. Premature spillage/delayed swallow to the valleculae. No aspiration observed.    Clinical Swallow Eval: Puree Solid Texture Trial   Mode of Presentation, Puree spoon;fed by clinician   Volume of Puree Presented 2 tablespoons   Oral Phase, Puree Premature pharyngeal entry   Pharyngeal Phase, Puree intact   Diagnostic Statement PO trials evaluated under endoscopy completed by MD. Premature spillage/delayed swallow to the valleculae. No aspiration observed.    Swallow Compensations   Swallow Compensations No compensations were used   Educational Assessment   Barriers to Learning Cognitive   Swallow Eval: Clinical Impressions   Skilled Criteria for Therapy Intervention Current level of function same as previous level of function   Dysphagia Outcome Severity Scale (SILAS) Level 2 - SILAS   Treatment Diagnosis Moderately Severe Oropharygeal Dysphagia   Diet texture recommendations Dysphagia diet level 2;Honey thick liquids   Recommended Feeding/Eating Techniques alternate between small bites and sips of food/liquid;maintain upright posture during/after eating for 30 mins;small sips/bites   Predicted Duration of Therapy  Intervention (days/wks) Evaluation only   Risks and Benefits of Treatment have been explained. Yes   Patient, family and/or staff in agreement with Plan of Care Yes   Clinical Impression Comments Rico Burton continues to demonstrate moderately severe oropharyngeal dysphagia characterized by delayed swallow reflex and silent aspiration. Pt assessed with PO trials under endoscopy completed by MD today. Puree trials reveal premature spillage/delayed swallow to the valleculae. No aspiration or significant residuals observed. With thin liquids, delayed swallow response with premature spillage to the piriforms and into laryngeal vestibule. Silent aspiration with residuals on the cords observed after the swallow. No reflexive response. Pt had video swallow study recently which also demonstrated aspiration on nectar thick liquids. Honey thick liquid trials today reveal delayed swallow response to valleculae with no aspiration. D/t pt's cognitive dysfunction and difficulty following directions/commands, he is unable to use safe/compensatory swallow strategies. Based on results of evaluation, recommend pt continue with soft moist solids and honey thick liquids. Pt should take pills whole in puree texture. Should pt begin to experience unintentional weightloss or aspiration pneumonia, he may benefit from alternative means of nutrition/hydration. Trained pt to complete oral cares prior to PO intake. Written results and recommendations send to care facility with the pt. No further SLP services for swallowing are warranted at this time.    Total Session Time   SLP Eval: oral/pharyngeal swallow function, clinical minutes (74545) 15   Total Evaluation Time 15     Thank you for the referral of Rico Burton. If you have any questions about this report, please contact me using the information below.     MAME Vargas. (MARGARITO becerra, CCC-SLP   Speech Language Pathologist  [facility] Trained Vocologist   [facility]  Dept. of Otolaryngology  Department of Rehabilitation Services  [address] [place] [id number]  Email: [email or web address]   Pronouns: she/her/hers

## 2021-05-05 LAB
ALBUMIN SERPL-MCNC: 3.4 G/DL (ref 3.5–5)
ALP SERPL-CCNC: 91 U/L (ref 45–120)
ALT SERPL W P-5'-P-CCNC: 13 U/L (ref 0–45)
ANION GAP SERPL CALCULATED.3IONS-SCNC: 11 MMOL/L (ref 5–18)
AST SERPL W P-5'-P-CCNC: 17 U/L (ref 0–40)
BILIRUB SERPL-MCNC: 0.7 MG/DL (ref 0–1)
BUN SERPL-MCNC: 19 MG/DL (ref 8–22)
CALCIUM SERPL-MCNC: 9.2 MG/DL (ref 8.5–10.5)
CHLORIDE BLD-SCNC: 102 MMOL/L (ref 98–107)
CO2 SERPL-SCNC: 29 MMOL/L (ref 22–31)
CREAT SERPL-MCNC: 1.02 MG/DL (ref 0.7–1.3)
GFR SERPL CREATININE-BSD FRML MDRD: >60 ML/MIN/1.73M2
GLUCOSE BLD-MCNC: 70 MG/DL (ref 70–125)
HGB BLD-MCNC: 14.9 G/DL (ref 14–18)
IRON SATN MFR SERPL: 48 % (ref 20–50)
IRON SERPL-MCNC: 119 UG/DL (ref 42–175)
POTASSIUM BLD-SCNC: 3.6 MMOL/L (ref 3.5–5)
PROT SERPL-MCNC: 7.3 G/DL (ref 6–8)
SODIUM SERPL-SCNC: 142 MMOL/L (ref 136–145)
TIBC SERPL-MCNC: 247 UG/DL (ref 313–563)
TRANSFERRIN SERPL-MCNC: 198 MG/DL (ref 212–360)
VIT B12 SERPL-MCNC: 576 PG/ML (ref 213–816)

## 2021-05-14 DIAGNOSIS — R39.9 LOWER URINARY TRACT SYMPTOMS (LUTS): ICD-10-CM

## 2021-05-14 LAB — PSA SERPL-MCNC: 6.61 UG/L (ref 0–4)

## 2021-05-14 PROCEDURE — 36415 COLL VENOUS BLD VENIPUNCTURE: CPT | Performed by: PATHOLOGY

## 2021-05-14 PROCEDURE — 84153 ASSAY OF PSA TOTAL: CPT | Performed by: PATHOLOGY

## 2021-06-16 ENCOUNTER — PRE VISIT (OUTPATIENT)
Dept: UROLOGY | Facility: CLINIC | Age: 56
End: 2021-06-16

## 2021-06-16 NOTE — TELEPHONE ENCOUNTER
Reason for Visit: Follow-up    Diagnosis: LUTS, Elevated PSA    Orders/Procedures/Records: Available    Contact Patient: N/A    Rooming Requirements: Lakeisha Javed  06/16/21  1:39 PM

## 2021-06-16 NOTE — TELEPHONE ENCOUNTER
Reason for Visit: Follow-up    Diagnosis: Elevated PSA    Orders/Procedures/Records: Available    Contact Patient: N/A    Rooming Requirements: Lakeisha Javed  06/16/21  12:40 PM

## 2021-06-17 ENCOUNTER — RECORDS - HEALTHEAST (OUTPATIENT)
Dept: LAB | Facility: CLINIC | Age: 56
End: 2021-06-17

## 2021-06-18 DIAGNOSIS — R39.9 LOWER URINARY TRACT SYMPTOMS (LUTS): Primary | ICD-10-CM

## 2021-06-18 LAB
ALBUMIN SERPL-MCNC: 3.5 G/DL (ref 3.5–5)
ALP SERPL-CCNC: 81 U/L (ref 45–120)
ALT SERPL W P-5'-P-CCNC: 11 U/L (ref 0–45)
ANION GAP SERPL CALCULATED.3IONS-SCNC: 10 MMOL/L (ref 5–18)
AST SERPL W P-5'-P-CCNC: 10 U/L (ref 0–40)
BILIRUB SERPL-MCNC: 0.4 MG/DL (ref 0–1)
BUN SERPL-MCNC: 23 MG/DL (ref 8–22)
CALCIUM SERPL-MCNC: 8.6 MG/DL (ref 8.5–10.5)
CHLORIDE BLD-SCNC: 104 MMOL/L (ref 98–107)
CO2 SERPL-SCNC: 27 MMOL/L (ref 22–31)
CREAT SERPL-MCNC: 1.06 MG/DL (ref 0.7–1.3)
GFR SERPL CREATININE-BSD FRML MDRD: >60 ML/MIN/1.73M2
GLUCOSE BLD-MCNC: 82 MG/DL (ref 70–125)
HGB BLD-MCNC: 13.9 G/DL (ref 14–18)
POTASSIUM BLD-SCNC: 3.8 MMOL/L (ref 3.5–5)
PROT SERPL-MCNC: 6.7 G/DL (ref 6–8)
PSA SERPL-MCNC: 5.4 NG/ML (ref 0–3.5)
SODIUM SERPL-SCNC: 141 MMOL/L (ref 136–145)

## 2021-07-06 ENCOUNTER — PRE VISIT (OUTPATIENT)
Dept: UROLOGY | Facility: CLINIC | Age: 56
End: 2021-07-06

## 2021-07-13 ENCOUNTER — LAB REQUISITION (OUTPATIENT)
Dept: LAB | Facility: CLINIC | Age: 56
End: 2021-07-13
Payer: COMMERCIAL

## 2021-07-13 DIAGNOSIS — Z00.8 ENCOUNTER FOR OTHER GENERAL EXAMINATION: ICD-10-CM

## 2021-07-14 LAB
BASOPHILS # BLD AUTO: 0.1 10E3/UL (ref 0–0.2)
BASOPHILS NFR BLD AUTO: 1 %
CHOLEST SERPL-MCNC: 160 MG/DL
EOSINOPHIL # BLD AUTO: 0.4 10E3/UL (ref 0–0.7)
EOSINOPHIL NFR BLD AUTO: 6 %
ERYTHROCYTE [DISTWIDTH] IN BLOOD BY AUTOMATED COUNT: 12.6 % (ref 10–15)
FASTING STATUS PATIENT QL REPORTED: ABNORMAL
HBV SURFACE AB SERPLBLD QL IA.RAPID: NEGATIVE
HCT VFR BLD AUTO: 40.1 % (ref 40–53)
HCV AB SERPL QL IA: NEGATIVE
HDLC SERPL-MCNC: 38 MG/DL
HGB BLD-MCNC: 13.2 G/DL (ref 13.3–17.7)
IMM GRANULOCYTES # BLD: 0 10E3/UL
IMM GRANULOCYTES NFR BLD: 0 %
LDLC SERPL CALC-MCNC: 103 MG/DL
LYMPHOCYTES # BLD AUTO: 1.4 10E3/UL (ref 0.8–5.3)
LYMPHOCYTES NFR BLD AUTO: 24 %
MCH RBC QN AUTO: 29.8 PG (ref 26.5–33)
MCHC RBC AUTO-ENTMCNC: 32.9 G/DL (ref 31.5–36.5)
MCV RBC AUTO: 91 FL (ref 78–100)
MONOCYTES # BLD AUTO: 0.5 10E3/UL (ref 0–1.3)
MONOCYTES NFR BLD AUTO: 9 %
NEUTROPHILS # BLD AUTO: 3.5 10E3/UL (ref 1.6–8.3)
NEUTROPHILS NFR BLD AUTO: 60 %
NRBC # BLD AUTO: 0 10E3/UL
NRBC BLD AUTO-RTO: 0 /100
PLATELET # BLD AUTO: 201 10E3/UL (ref 150–450)
RBC # BLD AUTO: 4.43 10E6/UL (ref 4.4–5.9)
TRIGL SERPL-MCNC: 94 MG/DL
WBC # BLD AUTO: 5.8 10E3/UL (ref 4–11)

## 2021-07-14 PROCEDURE — P9603 ONE-WAY ALLOW PRORATED MILES: HCPCS | Mod: ORL | Performed by: PHYSICIAN ASSISTANT

## 2021-07-14 PROCEDURE — 85025 COMPLETE CBC W/AUTO DIFF WBC: CPT | Mod: ORL | Performed by: PHYSICIAN ASSISTANT

## 2021-07-14 PROCEDURE — 86706 HEP B SURFACE ANTIBODY: CPT | Mod: ORL | Performed by: PHYSICIAN ASSISTANT

## 2021-07-14 PROCEDURE — 80061 LIPID PANEL: CPT | Mod: ORL | Performed by: PHYSICIAN ASSISTANT

## 2021-07-14 PROCEDURE — 86803 HEPATITIS C AB TEST: CPT | Mod: ORL | Performed by: PHYSICIAN ASSISTANT

## 2021-07-14 PROCEDURE — 36415 COLL VENOUS BLD VENIPUNCTURE: CPT | Mod: ORL | Performed by: PHYSICIAN ASSISTANT

## 2021-07-23 ENCOUNTER — OFFICE VISIT (OUTPATIENT)
Dept: UROLOGY | Facility: CLINIC | Age: 56
End: 2021-07-23
Payer: COMMERCIAL

## 2021-07-23 VITALS
HEART RATE: 59 BPM | WEIGHT: 144 LBS | SYSTOLIC BLOOD PRESSURE: 135 MMHG | BODY MASS INDEX: 23.14 KG/M2 | HEIGHT: 66 IN | DIASTOLIC BLOOD PRESSURE: 87 MMHG

## 2021-07-23 DIAGNOSIS — R39.9 LOWER URINARY TRACT SYMPTOMS (LUTS): ICD-10-CM

## 2021-07-23 DIAGNOSIS — R97.20 ELEVATED PROSTATE SPECIFIC ANTIGEN (PSA): Primary | ICD-10-CM

## 2021-07-23 PROCEDURE — 99214 OFFICE O/P EST MOD 30 MIN: CPT | Performed by: STUDENT IN AN ORGANIZED HEALTH CARE EDUCATION/TRAINING PROGRAM

## 2021-07-23 RX ORDER — TAMSULOSIN HYDROCHLORIDE 0.4 MG/1
0.4 CAPSULE ORAL DAILY
Qty: 90 CAPSULE | Refills: 1 | Status: SHIPPED | OUTPATIENT
Start: 2021-07-23 | End: 2022-01-19

## 2021-07-23 ASSESSMENT — MIFFLIN-ST. JEOR: SCORE: 1415.93

## 2021-07-23 NOTE — LETTER
"7/23/2021       RE: Rico Carr  [address] [facility] [address]     Dear Colleague,    Thank you for referring your patient, Rico Carr, to the [facility]OLOG[facility] [place] at [facility]. Please see a copy of my visit note below.    CHIEF COMPLAINT   It was my pleasure to see Rico Carr who is a 56 year old male for follow-up of elevated PSA.      HPI:  Rico Carr is a 56 year old male being seen for Elevated PSA.  Duration of problem: 3 months  Previous treatments: none     accompanied by his Care Home attendant:  Rico is a poor historian and it is difficult to understand what his current issues are. Based on the conversation I had with the attendant, Homer is likely to remain in the care facility long term and has a brother who is listed as next of kin whom I have not met in the last 2 visits.  His PSA has been found to be elevated during the last 2 readings and a rectal exam was normal when I last saw him.  He had initially presente with voiding issues but now wears diapers and says that he goes to void on his own as well.    Exam:  /87   Pulse 59   Ht 1.676 m (5' 6\")   Wt 65.3 kg (144 lb)   BMI 23.24 kg/m    General: age-appropriate appearing male in NAD sitting in a wheelchair, not very coherent  Resp: no respiratory distress  CV: heart rate regular  Abdomen: Degree of obesity is mild. Abdomen is soft and nontender. No organomegaly. Neuro: grossly non focal. Normal reflexes  Motor: excellent strength throughout    Review of Imaging:  The following imaging exams were independently viewed and interpreted by me and discussed with patient:      Review of Labs:  The following labs were reviewed by me and discussed with the patient:  PSA: Abnormal:   Results for RICO CARR (MRN [id number]) as of 7/29/2021 15:25   Ref. Range 11/23/2020 06:34 5/14/2021 07:52 6/18/2021 11:30   PSA Latest Ref Range: 0.00 - 3.50 ng/mL 4.8 " (H) 6.61 (H) 5.4 (H)       Assessment & Plan     Lower urinary tract symptoms (LUTS)  Better according to him, but reliability not very certain  WIll continue with It .  - tamsulosin (FLOMAX) 0.4 MG capsule; Take 1 capsule (0.4 mg) by mouth daily    Elevated prostate specific antigen (PSA)  Discussed about the need to Do a MRI of the prostate to decide about biopsy.  BAsed on the MRI it should be good to discuss with his next of kin regarding further plans as we may need to do invasive procedures and it would be best to have someone else along with him in terms of medical decision making.  - MR Prostate wo & w Contrast; Future      Darienmervat Salguero MD  [facility]OLOG[facility] [place]      ==========================    Additional Billing and Coding Information:  Review of the result(s) of each unique test - PSA    Independent interpretation of a test performed by another physician/other qualified health care professional (not separately reported) - NA    Discussion of management or test interpretation with external physician/other qualified healthcare professional/appropriate source - NA    Diagnosis or treatment significantly limited by social determinants of health - NA    15 minutes spent on the date of the encounter doing chart review, review of test results, interpretation of tests, patient visit and documentation     ==========================

## 2021-07-23 NOTE — PATIENT INSTRUCTIONS
Please get an MRI.    It was a pleasure meeting with you today.  Thank you for allowing me and my team the privilege of caring for you today.  YOU are the reason we are here, and I truly hope we provided you with the excellent service you deserve.  Please let us know if there is anything else we can do for you so that we can be sure you are leaving completely satisfied with your care experience.

## 2021-07-27 ENCOUNTER — LAB REQUISITION (OUTPATIENT)
Dept: LAB | Facility: CLINIC | Age: 56
End: 2021-07-27
Payer: COMMERCIAL

## 2021-07-27 DIAGNOSIS — R13.19 OTHER DYSPHAGIA: ICD-10-CM

## 2021-07-28 LAB
BASOPHILS # BLD AUTO: 0.1 10E3/UL (ref 0–0.2)
BASOPHILS NFR BLD AUTO: 1 %
CHOLEST SERPL-MCNC: 145 MG/DL
EOSINOPHIL # BLD AUTO: 0.4 10E3/UL (ref 0–0.7)
EOSINOPHIL NFR BLD AUTO: 6 %
ERYTHROCYTE [DISTWIDTH] IN BLOOD BY AUTOMATED COUNT: 12.8 % (ref 10–15)
FASTING STATUS PATIENT QL REPORTED: ABNORMAL
HCT VFR BLD AUTO: 41.9 % (ref 40–53)
HDLC SERPL-MCNC: 37 MG/DL
HGB BLD-MCNC: 13.7 G/DL (ref 13.3–17.7)
HIV 1+2 AB+HIV1 P24 AG SERPL QL IA: NEGATIVE
IMM GRANULOCYTES # BLD: 0 10E3/UL
IMM GRANULOCYTES NFR BLD: 0 %
LDLC SERPL CALC-MCNC: 92 MG/DL
LYMPHOCYTES # BLD AUTO: 1.8 10E3/UL (ref 0.8–5.3)
LYMPHOCYTES NFR BLD AUTO: 25 %
MCH RBC QN AUTO: 30.5 PG (ref 26.5–33)
MCHC RBC AUTO-ENTMCNC: 32.7 G/DL (ref 31.5–36.5)
MCV RBC AUTO: 93 FL (ref 78–100)
MONOCYTES # BLD AUTO: 0.7 10E3/UL (ref 0–1.3)
MONOCYTES NFR BLD AUTO: 9 %
NEUTROPHILS # BLD AUTO: 4.1 10E3/UL (ref 1.6–8.3)
NEUTROPHILS NFR BLD AUTO: 59 %
NRBC # BLD AUTO: 0 10E3/UL
NRBC BLD AUTO-RTO: 0 /100
PLATELET # BLD AUTO: 222 10E3/UL (ref 150–450)
RBC # BLD AUTO: 4.49 10E6/UL (ref 4.4–5.9)
TRIGL SERPL-MCNC: 80 MG/DL
WBC # BLD AUTO: 7.1 10E3/UL (ref 4–11)

## 2021-07-28 PROCEDURE — 36415 COLL VENOUS BLD VENIPUNCTURE: CPT | Mod: ORL | Performed by: NURSE PRACTITIONER

## 2021-07-28 PROCEDURE — 87389 HIV-1 AG W/HIV-1&-2 AB AG IA: CPT | Mod: ORL | Performed by: NURSE PRACTITIONER

## 2021-07-28 PROCEDURE — 85025 COMPLETE CBC W/AUTO DIFF WBC: CPT | Mod: ORL | Performed by: NURSE PRACTITIONER

## 2021-07-28 PROCEDURE — 80061 LIPID PANEL: CPT | Mod: ORL | Performed by: NURSE PRACTITIONER

## 2021-07-28 PROCEDURE — P9603 ONE-WAY ALLOW PRORATED MILES: HCPCS | Mod: ORL | Performed by: NURSE PRACTITIONER

## 2021-07-29 NOTE — PROGRESS NOTES
"CHIEF COMPLAINT   It was my pleasure to see Rico Carr who is a 56 year old male for follow-up of elevated PSA.      HPI:  Rico Carr is a 56 year old male being seen for Elevated PSA.  Duration of problem: 3 months  Previous treatments: none     accompanied by his Care Home attendant:  Rico is a poor historian and it is difficult to understand what his current issues are. Based on the conversation I had with the attendant, Homer is likely to remain in the care facility long term and has a brother who is listed as next of kin whom I have not met in the last 2 visits.  His PSA has been found to be elevated during the last 2 readings and a rectal exam was normal when I last saw him.  He had initially presente with voiding issues but now wears diapers and says that he goes to void on his own as well.    Exam:  /87   Pulse 59   Ht 1.676 m (5' 6\")   Wt 65.3 kg (144 lb)   BMI 23.24 kg/m    General: age-appropriate appearing male in NAD sitting in a wheelchair, not very coherent  Resp: no respiratory distress  CV: heart rate regular  Abdomen: Degree of obesity is mild. Abdomen is soft and nontender. No organomegaly. Neuro: grossly non focal. Normal reflexes  Motor: excellent strength throughout    Review of Imaging:  The following imaging exams were independently viewed and interpreted by me and discussed with patient:      Review of Labs:  The following labs were reviewed by me and discussed with the patient:  PSA: Abnormal:   Results for RICO CARR (MRN [id number]) as of 7/29/2021 15:25   Ref. Range 11/23/2020 06:34 5/14/2021 07:52 6/18/2021 11:30   PSA Latest Ref Range: 0.00 - 3.50 ng/mL 4.8 (H) 6.61 (H) 5.4 (H)       Assessment & Plan     Lower urinary tract symptoms (LUTS)  Better according to him, but reliability not very certain  WIll continue with It .  - tamsulosin (FLOMAX) 0.4 MG capsule; Take 1 capsule (0.4 mg) by mouth daily    Elevated prostate specific antigen (PSA)  Discussed about the " need to Do a MRI of the prostate to decide about biopsy.  BAsed on the MRI it should be good to discuss with his next of kin regarding further plans as we may need to do invasive procedures and it would be best to have someone else along with him in terms of medical decision making.  - MR Prostate wo & w Contrast; Future      Darienmervat Salguero MD  [facility] [place]      ==========================    Additional Billing and Coding Information:  Review of the result(s) of each unique test - PSA    Independent interpretation of a test performed by another physician/other qualified health care professional (not separately reported) - NA    Discussion of management or test interpretation with external physician/other qualified healthcare professional/appropriate source - NA    Diagnosis or treatment significantly limited by social determinants of health - NA    15 minutes spent on the date of the encounter doing chart review, review of test results, interpretation of tests, patient visit and documentation     ==========================

## 2021-08-14 ENCOUNTER — LAB REQUISITION (OUTPATIENT)
Dept: LAB | Facility: CLINIC | Age: 56
End: 2021-08-14
Payer: COMMERCIAL

## 2021-08-14 DIAGNOSIS — E55.9 VITAMIN D DEFICIENCY, UNSPECIFIED: ICD-10-CM

## 2021-08-16 ENCOUNTER — PRE VISIT (OUTPATIENT)
Dept: UROLOGY | Facility: CLINIC | Age: 56
End: 2021-08-16

## 2021-08-16 PROCEDURE — 82306 VITAMIN D 25 HYDROXY: CPT | Mod: ORL | Performed by: NURSE PRACTITIONER

## 2021-08-16 PROCEDURE — 36415 COLL VENOUS BLD VENIPUNCTURE: CPT | Mod: ORL | Performed by: NURSE PRACTITIONER

## 2021-08-16 PROCEDURE — P9604 ONE-WAY ALLOW PRORATED TRIP: HCPCS | Mod: ORL | Performed by: NURSE PRACTITIONER

## 2021-08-16 NOTE — TELEPHONE ENCOUNTER
Reason for Visit: Follow-up on MRI    Diagnosis: Elevated PSA    Orders/Procedures/Records: in system    Contact Patient: n/a    Rooming Requirements: Roger Javed  08/16/21  8:14 AM

## 2021-08-17 LAB — DEPRECATED CALCIDIOL+CALCIFEROL SERPL-MC: 43 UG/L (ref 30–80)

## 2021-08-19 ENCOUNTER — HOSPITAL ENCOUNTER (OUTPATIENT)
Dept: MRI IMAGING | Facility: CLINIC | Age: 56
Discharge: HOME | End: 2021-08-19
Attending: STUDENT IN AN ORGANIZED HEALTH CARE EDUCATION/TRAINING PROGRAM | Admitting: STUDENT IN AN ORGANIZED HEALTH CARE EDUCATION/TRAINING PROGRAM
Payer: COMMERCIAL

## 2021-08-19 ENCOUNTER — LAB REQUISITION (OUTPATIENT)
Dept: LAB | Facility: CLINIC | Age: 56
End: 2021-08-19
Payer: COMMERCIAL

## 2021-08-19 DIAGNOSIS — D51.8 OTHER VITAMIN B12 DEFICIENCY ANEMIAS: ICD-10-CM

## 2021-08-19 DIAGNOSIS — R97.20 ELEVATED PROSTATE SPECIFIC ANTIGEN (PSA): ICD-10-CM

## 2021-08-19 PROCEDURE — 255N000002 HC RX 255 OP 636: Performed by: STUDENT IN AN ORGANIZED HEALTH CARE EDUCATION/TRAINING PROGRAM

## 2021-08-19 PROCEDURE — 72197 MRI PELVIS W/O & W/DYE: CPT

## 2021-08-19 PROCEDURE — 72197 MRI PELVIS W/O & W/DYE: CPT | Mod: 26 | Performed by: RADIOLOGY

## 2021-08-19 PROCEDURE — A9585 GADOBUTROL INJECTION: HCPCS | Performed by: STUDENT IN AN ORGANIZED HEALTH CARE EDUCATION/TRAINING PROGRAM

## 2021-08-19 RX ORDER — GADOBUTROL 604.72 MG/ML
7.5 INJECTION INTRAVENOUS ONCE
Status: COMPLETED | OUTPATIENT
Start: 2021-08-19 | End: 2021-08-19

## 2021-08-19 RX ADMIN — GADOBUTROL 7.5 ML: 604.72 INJECTION INTRAVENOUS at 13:28

## 2021-08-20 ENCOUNTER — VIRTUAL VISIT (OUTPATIENT)
Dept: UROLOGY | Facility: CLINIC | Age: 56
End: 2021-08-20
Payer: COMMERCIAL

## 2021-08-20 DIAGNOSIS — R97.20 ELEVATED PROSTATE SPECIFIC ANTIGEN (PSA): Primary | ICD-10-CM

## 2021-08-20 LAB — VIT B12 SERPL-MCNC: 602 PG/ML (ref 213–816)

## 2021-08-20 PROCEDURE — 82607 VITAMIN B-12: CPT | Mod: ORL | Performed by: NURSE PRACTITIONER

## 2021-08-20 PROCEDURE — 36415 COLL VENOUS BLD VENIPUNCTURE: CPT | Mod: ORL | Performed by: NURSE PRACTITIONER

## 2021-08-20 PROCEDURE — P9604 ONE-WAY ALLOW PRORATED TRIP: HCPCS | Mod: ORL | Performed by: NURSE PRACTITIONER

## 2021-08-20 PROCEDURE — 99207 PR NO CHARGE LOS: CPT | Performed by: STUDENT IN AN ORGANIZED HEALTH CARE EDUCATION/TRAINING PROGRAM

## 2021-08-20 NOTE — LETTER
8/20/2021       RE: Rico Burton  [address] [facility] [address]     Dear Colleague,    Thank you for referring your patient, Rico Burton, to the [facility] UROLOG[facility]. Please see a copy of my visit note below.    Rico is a 56 year old who is being evaluated via a billable telephone visit.      What phone number would you like to be contacted at? [phone] Ask for 2nd floor   How would you like to obtain your AVS? Mail a copy  Patient could not be contacted on the phone,      Again, thank you for allowing me to participate in the care of your patient.      Sincerely,    Darien Salguero MD

## 2021-08-20 NOTE — LETTER
Date:October 2, 2021      Provider requested that no letter be sent. Do not send.       [facility]

## 2021-08-20 NOTE — PROGRESS NOTES
Rico is a 56 year old who is being evaluated via a billable telephone visit.      What phone number would you like to be contacted at? [phone] Ask for 2nd floor   How would you like to obtain your AVS? Mail a copy  Patient could not be contacted on the phone,

## 2021-08-31 ENCOUNTER — LAB REQUISITION (OUTPATIENT)
Dept: LAB | Facility: CLINIC | Age: 56
End: 2021-08-31
Payer: COMMERCIAL

## 2021-08-31 DIAGNOSIS — R06.09 OTHER FORMS OF DYSPNEA: ICD-10-CM

## 2021-09-01 LAB
ANION GAP SERPL CALCULATED.3IONS-SCNC: 8 MMOL/L (ref 5–18)
BASOPHILS # BLD AUTO: 0.1 10E3/UL (ref 0–0.2)
BASOPHILS NFR BLD AUTO: 1 %
BUN SERPL-MCNC: 17 MG/DL (ref 8–22)
CALCIUM SERPL-MCNC: 9.3 MG/DL (ref 8.5–10.5)
CHLORIDE BLD-SCNC: 107 MMOL/L (ref 98–107)
CO2 SERPL-SCNC: 31 MMOL/L (ref 22–31)
CREAT SERPL-MCNC: 1.08 MG/DL (ref 0.7–1.3)
EOSINOPHIL # BLD AUTO: 0.5 10E3/UL (ref 0–0.7)
EOSINOPHIL NFR BLD AUTO: 7 %
ERYTHROCYTE [DISTWIDTH] IN BLOOD BY AUTOMATED COUNT: 12.9 % (ref 10–15)
GFR SERPL CREATININE-BSD FRML MDRD: 75 ML/MIN/1.73M2
GLUCOSE BLD-MCNC: 64 MG/DL (ref 70–125)
HCT VFR BLD AUTO: 40.9 % (ref 40–53)
HGB BLD-MCNC: 13.8 G/DL (ref 13.3–17.7)
IMM GRANULOCYTES # BLD: 0 10E3/UL
IMM GRANULOCYTES NFR BLD: 0 %
LYMPHOCYTES # BLD AUTO: 1.4 10E3/UL (ref 0.8–5.3)
LYMPHOCYTES NFR BLD AUTO: 22 %
MCH RBC QN AUTO: 30.4 PG (ref 26.5–33)
MCHC RBC AUTO-ENTMCNC: 33.7 G/DL (ref 31.5–36.5)
MCV RBC AUTO: 90 FL (ref 78–100)
MONOCYTES # BLD AUTO: 0.6 10E3/UL (ref 0–1.3)
MONOCYTES NFR BLD AUTO: 9 %
NEUTROPHILS # BLD AUTO: 3.9 10E3/UL (ref 1.6–8.3)
NEUTROPHILS NFR BLD AUTO: 61 %
NRBC # BLD AUTO: 0 10E3/UL
NRBC BLD AUTO-RTO: 0 /100
PLATELET # BLD AUTO: 196 10E3/UL (ref 150–450)
POTASSIUM BLD-SCNC: 3.5 MMOL/L (ref 3.5–5)
PROCALCITONIN SERPL-MCNC: 0.02 NG/ML (ref 0–0.49)
PROLACTIN SERPL-MCNC: 33.8 NG/ML (ref 0–15)
RBC # BLD AUTO: 4.54 10E6/UL (ref 4.4–5.9)
SODIUM SERPL-SCNC: 146 MMOL/L (ref 136–145)
WBC # BLD AUTO: 6.4 10E3/UL (ref 4–11)

## 2021-09-01 PROCEDURE — 85025 COMPLETE CBC W/AUTO DIFF WBC: CPT | Mod: ORL

## 2021-09-01 PROCEDURE — 84146 ASSAY OF PROLACTIN: CPT | Mod: ORL

## 2021-09-01 PROCEDURE — 36415 COLL VENOUS BLD VENIPUNCTURE: CPT | Mod: ORL

## 2021-09-01 PROCEDURE — 80048 BASIC METABOLIC PNL TOTAL CA: CPT | Mod: ORL

## 2021-09-01 PROCEDURE — P9603 ONE-WAY ALLOW PRORATED MILES: HCPCS | Mod: ORL

## 2021-09-01 PROCEDURE — 84145 PROCALCITONIN (PCT): CPT | Mod: ORL

## 2021-11-24 ENCOUNTER — LAB REQUISITION (OUTPATIENT)
Dept: LAB | Facility: CLINIC | Age: 56
End: 2021-11-24
Payer: COMMERCIAL

## 2021-11-24 DIAGNOSIS — R63.4 ABNORMAL WEIGHT LOSS: ICD-10-CM

## 2021-11-26 LAB
ALBUMIN SERPL-MCNC: 3.6 G/DL (ref 3.5–5)
ALP SERPL-CCNC: 89 U/L (ref 45–120)
ALT SERPL W P-5'-P-CCNC: 12 U/L (ref 0–45)
ANION GAP SERPL CALCULATED.3IONS-SCNC: 9 MMOL/L (ref 5–18)
AST SERPL W P-5'-P-CCNC: 15 U/L (ref 0–40)
BILIRUB SERPL-MCNC: 0.6 MG/DL (ref 0–1)
BUN SERPL-MCNC: 12 MG/DL (ref 8–22)
CALCIUM SERPL-MCNC: 9.4 MG/DL (ref 8.5–10.5)
CHLORIDE BLD-SCNC: 103 MMOL/L (ref 98–107)
CO2 SERPL-SCNC: 28 MMOL/L (ref 22–31)
CREAT SERPL-MCNC: 1.03 MG/DL (ref 0.7–1.3)
GFR SERPL CREATININE-BSD FRML MDRD: 80 ML/MIN/1.73M2
GLUCOSE BLD-MCNC: 80 MG/DL (ref 70–125)
POTASSIUM BLD-SCNC: 3.9 MMOL/L (ref 3.5–5)
PROT SERPL-MCNC: 7.1 G/DL (ref 6–8)
SODIUM SERPL-SCNC: 140 MMOL/L (ref 136–145)

## 2021-11-26 PROCEDURE — 36415 COLL VENOUS BLD VENIPUNCTURE: CPT | Mod: ORL | Performed by: FAMILY MEDICINE

## 2021-11-26 PROCEDURE — 80053 COMPREHEN METABOLIC PANEL: CPT | Mod: ORL | Performed by: FAMILY MEDICINE

## 2021-11-26 PROCEDURE — P9603 ONE-WAY ALLOW PRORATED MILES: HCPCS | Mod: ORL | Performed by: FAMILY MEDICINE

## 2022-01-25 ENCOUNTER — LAB REQUISITION (OUTPATIENT)
Dept: LAB | Facility: CLINIC | Age: 57
End: 2022-01-25
Payer: COMMERCIAL

## 2022-01-25 DIAGNOSIS — D50.8 OTHER IRON DEFICIENCY ANEMIAS: ICD-10-CM

## 2022-01-26 LAB — HGB BLD-MCNC: 14.4 G/DL (ref 13.3–17.7)

## 2022-01-26 PROCEDURE — P9603 ONE-WAY ALLOW PRORATED MILES: HCPCS | Mod: ORL

## 2022-01-26 PROCEDURE — 36415 COLL VENOUS BLD VENIPUNCTURE: CPT | Mod: ORL

## 2022-01-26 PROCEDURE — 85018 HEMOGLOBIN: CPT | Mod: ORL

## 2022-05-03 ENCOUNTER — LAB REQUISITION (OUTPATIENT)
Dept: LAB | Facility: CLINIC | Age: 57
End: 2022-05-03
Payer: COMMERCIAL

## 2022-05-03 DIAGNOSIS — I10 ESSENTIAL (PRIMARY) HYPERTENSION: ICD-10-CM

## 2022-05-04 LAB
ANION GAP SERPL CALCULATED.3IONS-SCNC: 9 MMOL/L (ref 5–18)
BUN SERPL-MCNC: 19 MG/DL (ref 8–22)
CALCIUM SERPL-MCNC: 9.3 MG/DL (ref 8.5–10.5)
CHLORIDE BLD-SCNC: 107 MMOL/L (ref 98–107)
CO2 SERPL-SCNC: 29 MMOL/L (ref 22–31)
CREAT SERPL-MCNC: 1.08 MG/DL (ref 0.7–1.3)
GFR SERPL CREATININE-BSD FRML MDRD: 79 ML/MIN/1.73M2
GLUCOSE BLD-MCNC: 86 MG/DL (ref 70–125)
POTASSIUM BLD-SCNC: 3.5 MMOL/L (ref 3.5–5)
SODIUM SERPL-SCNC: 145 MMOL/L (ref 136–145)

## 2022-05-04 PROCEDURE — P9604 ONE-WAY ALLOW PRORATED TRIP: HCPCS | Mod: ORL

## 2022-05-04 PROCEDURE — 80048 BASIC METABOLIC PNL TOTAL CA: CPT | Mod: ORL

## 2022-05-04 PROCEDURE — 36415 COLL VENOUS BLD VENIPUNCTURE: CPT | Mod: ORL

## 2022-05-06 ENCOUNTER — LAB REQUISITION (OUTPATIENT)
Dept: LAB | Facility: CLINIC | Age: 57
End: 2022-05-06
Payer: COMMERCIAL

## 2022-05-06 DIAGNOSIS — I10 ESSENTIAL (PRIMARY) HYPERTENSION: ICD-10-CM

## 2022-05-06 DIAGNOSIS — E87.6 HYPOKALEMIA: ICD-10-CM

## 2022-05-06 LAB
ANION GAP SERPL CALCULATED.3IONS-SCNC: 10 MMOL/L (ref 5–18)
BUN SERPL-MCNC: 18 MG/DL (ref 8–22)
CALCIUM SERPL-MCNC: 9.1 MG/DL (ref 8.5–10.5)
CHLORIDE BLD-SCNC: 105 MMOL/L (ref 98–107)
CO2 SERPL-SCNC: 28 MMOL/L (ref 22–31)
CREAT SERPL-MCNC: 1.12 MG/DL (ref 0.7–1.3)
GFR SERPL CREATININE-BSD FRML MDRD: 76 ML/MIN/1.73M2
GLUCOSE BLD-MCNC: 140 MG/DL (ref 70–125)
POTASSIUM BLD-SCNC: 3.5 MMOL/L (ref 3.5–5)
SODIUM SERPL-SCNC: 143 MMOL/L (ref 136–145)

## 2022-05-06 PROCEDURE — 80048 BASIC METABOLIC PNL TOTAL CA: CPT | Mod: ORL

## 2022-05-06 PROCEDURE — 36415 COLL VENOUS BLD VENIPUNCTURE: CPT | Mod: ORL

## 2022-05-09 LAB — POTASSIUM BLD-SCNC: 3.7 MMOL/L (ref 3.5–5)

## 2022-05-09 PROCEDURE — 84132 ASSAY OF SERUM POTASSIUM: CPT | Mod: ORL

## 2022-05-09 PROCEDURE — P9604 ONE-WAY ALLOW PRORATED TRIP: HCPCS | Mod: ORL

## 2022-05-09 PROCEDURE — 36415 COLL VENOUS BLD VENIPUNCTURE: CPT | Mod: ORL

## 2022-05-19 ENCOUNTER — LAB REQUISITION (OUTPATIENT)
Dept: LAB | Facility: CLINIC | Age: 57
End: 2022-05-19
Payer: COMMERCIAL

## 2022-05-19 DIAGNOSIS — J96.01 ACUTE RESPIRATORY FAILURE WITH HYPOXIA (H): ICD-10-CM

## 2022-05-20 LAB
BASOPHILS # BLD AUTO: 0 10E3/UL (ref 0–0.2)
BASOPHILS NFR BLD AUTO: 1 %
EOSINOPHIL # BLD AUTO: 0.5 10E3/UL (ref 0–0.7)
EOSINOPHIL NFR BLD AUTO: 8 %
ERYTHROCYTE [DISTWIDTH] IN BLOOD BY AUTOMATED COUNT: 12.7 % (ref 10–15)
HCT VFR BLD AUTO: 45.7 % (ref 40–53)
HGB BLD-MCNC: 14.8 G/DL (ref 13.3–17.7)
IMM GRANULOCYTES # BLD: 0 10E3/UL
IMM GRANULOCYTES NFR BLD: 0 %
LYMPHOCYTES # BLD AUTO: 1.1 10E3/UL (ref 0.8–5.3)
LYMPHOCYTES NFR BLD AUTO: 18 %
MCH RBC QN AUTO: 30.3 PG (ref 26.5–33)
MCHC RBC AUTO-ENTMCNC: 32.4 G/DL (ref 31.5–36.5)
MCV RBC AUTO: 94 FL (ref 78–100)
MONOCYTES # BLD AUTO: 0.5 10E3/UL (ref 0–1.3)
MONOCYTES NFR BLD AUTO: 8 %
NEUTROPHILS # BLD AUTO: 4 10E3/UL (ref 1.6–8.3)
NEUTROPHILS NFR BLD AUTO: 65 %
NRBC # BLD AUTO: 0 10E3/UL
NRBC BLD AUTO-RTO: 0 /100
PLATELET # BLD AUTO: 223 10E3/UL (ref 150–450)
RBC # BLD AUTO: 4.88 10E6/UL (ref 4.4–5.9)
WBC # BLD AUTO: 6.1 10E3/UL (ref 4–11)

## 2022-05-20 PROCEDURE — 85025 COMPLETE CBC W/AUTO DIFF WBC: CPT | Mod: ORL | Performed by: FAMILY MEDICINE

## 2022-05-20 PROCEDURE — 36415 COLL VENOUS BLD VENIPUNCTURE: CPT | Mod: ORL | Performed by: FAMILY MEDICINE

## 2022-05-20 PROCEDURE — P9604 ONE-WAY ALLOW PRORATED TRIP: HCPCS | Mod: ORL | Performed by: FAMILY MEDICINE

## 2022-08-12 ENCOUNTER — LAB REQUISITION (OUTPATIENT)
Dept: LAB | Facility: CLINIC | Age: 57
End: 2022-08-12
Payer: COMMERCIAL

## 2022-08-12 DIAGNOSIS — R06.02 SHORTNESS OF BREATH: ICD-10-CM

## 2022-08-15 LAB — NT-PROBNP SERPL-MCNC: 37 PG/ML (ref 0–125)

## 2022-08-15 PROCEDURE — 83880 ASSAY OF NATRIURETIC PEPTIDE: CPT | Mod: ORL

## 2022-08-15 PROCEDURE — P9604 ONE-WAY ALLOW PRORATED TRIP: HCPCS | Mod: ORL

## 2022-08-15 PROCEDURE — 36415 COLL VENOUS BLD VENIPUNCTURE: CPT | Mod: ORL

## 2022-09-27 ENCOUNTER — TRANSCRIBE ORDERS (OUTPATIENT)
Dept: OTHER | Age: 57
End: 2022-09-27

## 2022-09-27 DIAGNOSIS — Z00.8 ENCOUNTER FOR OTHER GENERAL EXAMINATION: Primary | ICD-10-CM

## 2022-09-27 DIAGNOSIS — I10 ESSENTIAL HYPERTENSION: ICD-10-CM

## 2022-09-28 ENCOUNTER — TELEPHONE (OUTPATIENT)
Dept: GASTROENTEROLOGY | Facility: CLINIC | Age: 57
End: 2022-09-28

## 2022-09-28 NOTE — TELEPHONE ENCOUNTER
Screening Questions    BlueKIND OF PREP RedLOCATION [review exclusion criteria] GreenSEDATION TYPE      N Have you had a positive covid test in the last 90 days?   If yes, what date?        Y Are you able to give consent for your medical care?  (Sedation review/consideration needed)      N Are you active on mychart?       BP What insurance is in the chart?        ABSTRACT Ordering/Referring Provider:        23.2 BMI [BMI OVER 40-EXTENDED PREP]  BMI OVER 40 NEED PAC EVALUATION FOR [facility]     Respiratory Screening:  [If yes to any of the following HOSPITAL setting only]     N      Do you use daily home oxygen?   N      Do you have mod to severe Obstructive Sleep Apnea? Hospital only - Ok at [place]   N      Do you have Pulmonary Hypertension? NEED PAC APPT AT [facility]     N      Do you have UNCONTROLLED asthma?         N Have you had a heart or lung transplant?          N Are you currently on dialysis? [If yes, G-PREP & HOSPITAL setting only]        N  Do you have chronic kidney disease? [If yes, G-PREP]       N  Do you have a diagnosis of diabetes?[If yes, G-PREP]       N Have you had a stroke or Transient ischemic attack (TIA - aka  mini stroke ) within 6 months? (If yes, please review exclusion criteria)         N  In the past 6 months, have you had any heart related issues including cardiomyopathy or heart attack?          N  If yes, did it require cardiac stenting or other implantable device?          N Do you have any implantable devices in your body (pacemaker, defib, LVAD)? (If yes, please review exclusion criteria)       N Do you take nitroglycerin?          N If yes, how often?  (If yes, HOSPITAL setting ONLY)       N Are you currently taking any blood thinners?           [IF YES, INFORM PATIENT TO FOLLOW UP W/ ORDERING PROVIDER FOR BRIDGING INSTRUCTIONS]        N  Do you take Phentermine?      Yes-> Hold for 7 days before procedure.  Please consult your prescribing provider if you have questions about  holding this medication.       N Are you taking any prescription pain medications on a routine schedule? [EXTENDED PREP AND MAC]            [FEMALES] are you currently pregnant?                If yes, how many weeks? [Greater than 12 weeks, OR NEEDED]       N Any chemical dependencies such as alcohol, street drugs, or methadone? [If yes, MAC]       N Any history of post-traumatic stress syndrome, severe anxiety or history of psychosis? [If yes, MAC]       N ASST OF 1 Can you transfer from bed to chair? (If NO, please HOSPITAL setting  only)        N  On a regular basis do you go 3-5 days between bowel movements?[ If yes, EXTENDED PREP.]        Preferred LOCAL Pharmacy for Pre Prescription:      [facility] [address]                            Scheduling Details       Caller:   (Please ask for phone number if not scheduled by patient)    Type of Procedure Scheduled: Lower Endoscopy [Colonoscopy]    GPREP Which Colonoscopy Prep was Sent:    GERARD CF PATIENTS & GROEN'S PATIENTS NEEDS EXTENDED PREP    Date of Procedure: 11/2  Surgeon: GREG  Location: [facility]    Sedation Type: CS    Conscious Sedation- Needs  for 6 hours after the procedure  MAC/General-Needs  for 24 hours after procedure    N Pre-op Required at [facility] [place], [place] and OR for MAC sedation:        (Advise patient they will need a pre-op WITH IN 30 DAYS prior to procedure -)      Y Informed patient they will need an adult          Cannot take any type of public or medical transportation alone    Y Confirmed Nurse will call to complete assessment     HOME   Pre-Procedure Covid test to be completed [[facility] PCR Testing Required]       Additional comments:

## 2022-10-12 ENCOUNTER — TRANSFERRED RECORDS (OUTPATIENT)
Dept: HEALTH INFORMATION MANAGEMENT | Facility: CLINIC | Age: 57
End: 2022-10-12

## 2022-10-26 ENCOUNTER — TELEPHONE (OUTPATIENT)
Dept: GASTROENTEROLOGY | Facility: CLINIC | Age: 57
End: 2022-10-26

## 2022-10-26 DIAGNOSIS — Z12.11 SPECIAL SCREENING FOR MALIGNANT NEOPLASMS, COLON: Primary | ICD-10-CM

## 2022-10-26 RX ORDER — BISACODYL 5 MG/1
TABLET, DELAYED RELEASE ORAL
Qty: 4 TABLET | Refills: 0 | Status: SHIPPED | OUTPATIENT
Start: 2022-10-26

## 2022-10-26 NOTE — TELEPHONE ENCOUNTER
Pre assessment questions completed with Aydee - Nurse at [facility]  Fax [phone], for upcoming colonoscopy procedure scheduled on 11.2.22    COVID policy reviewed. Patient to complete rapid antigen test one to two days before their scheduled procedure. Patient to bring photo of the results when they come in for their procedure.    Reviewed procedural arrival time 1000 and facility location [facility].    Designated  policy reviewed. Instructed to have someone stay 6 hours post procedure.     Anticoagulation/blood thinners? No    Electronic implanted devices? No    Diabetic? No    Reviewed standard golytely prep instructions with Aydee. No fiber/iron supplements or foods that contain nuts/seeds prior to procedure.     Aydee verbalized understanding and had no questions or concerns at this time.    Chata Reynoso RN

## 2022-10-26 NOTE — TELEPHONE ENCOUNTER
Patient scheduled for colonoscopy on 11.2.22.     Covid test scheduled ?. Discuss at home test option.    Arrival time: 1000    Facility location: [facility]    Sedation type: CS    Indication for procedure: Screening    Anticoagulations? No     Bowel prep recommendation: Standard golytely    Golytely prep sent to  pharmacy. Prep instructions sent via [facility] letter 9.28.22    Pre visit planning completed.    Chata Reynoso RN

## 2022-11-01 ENCOUNTER — LAB REQUISITION (OUTPATIENT)
Dept: LAB | Facility: CLINIC | Age: 57
End: 2022-11-01
Payer: COMMERCIAL

## 2022-11-01 LAB — SARS-COV-2 RNA RESP QL NAA+PROBE: NEGATIVE

## 2022-11-01 PROCEDURE — U0003 INFECTIOUS AGENT DETECTION BY NUCLEIC ACID (DNA OR RNA); SEVERE ACUTE RESPIRATORY SYNDROME CORONAVIRUS 2 (SARS-COV-2) (CORONAVIRUS DISEASE [COVID-19]), AMPLIFIED PROBE TECHNIQUE, MAKING USE OF HIGH THROUGHPUT TECHNOLOGIES AS DESCRIBED BY CMS-2020-01-R: HCPCS | Mod: ORL | Performed by: FAMILY MEDICINE

## 2022-11-02 ENCOUNTER — HOSPITAL ENCOUNTER (OUTPATIENT)
Facility: CLINIC | Age: 57
Discharge: NURSING, REHAB OR LONG-TERM CARE | End: 2022-11-02
Attending: INTERNAL MEDICINE | Admitting: INTERNAL MEDICINE
Payer: COMMERCIAL

## 2022-11-02 VITALS
RESPIRATION RATE: 16 BRPM | HEART RATE: 61 BPM | OXYGEN SATURATION: 94 % | SYSTOLIC BLOOD PRESSURE: 130 MMHG | DIASTOLIC BLOOD PRESSURE: 92 MMHG

## 2022-11-02 PROCEDURE — 999N000141 HC STATISTIC PRE-PROCEDURE NURSING ASSESSMENT: Performed by: INTERNAL MEDICINE

## 2022-11-02 PROCEDURE — 250N000009 HC RX 250: Performed by: INTERNAL MEDICINE

## 2022-11-02 PROCEDURE — 999N000001 HC CANCELLED SURGERY UP TO 15 MINS: Performed by: INTERNAL MEDICINE

## 2022-11-02 RX ORDER — IPRATROPIUM BROMIDE AND ALBUTEROL SULFATE 2.5; .5 MG/3ML; MG/3ML
3 SOLUTION RESPIRATORY (INHALATION) ONCE
Status: COMPLETED | OUTPATIENT
Start: 2022-11-02 | End: 2022-11-02

## 2022-11-02 RX ADMIN — IPRATROPIUM BROMIDE AND ALBUTEROL SULFATE 3 ML: .5; 3 SOLUTION RESPIRATORY (INHALATION) at 11:11

## 2022-11-02 ASSESSMENT — ACTIVITIES OF DAILY LIVING (ADL): ADLS_ACUITY_SCORE: 35

## 2022-11-02 NOTE — PROGRESS NOTES
Pt sent from care facility for colonoscopy.    Pt was unaware of why this was being done and what procedure was. Very difficult to understand speech.    Initially unable to clarify indication for procedure or ordering MD. Transcribed order in Epic stated hypertension. No H+P available, only med list.    Initially no answer with multiple attempts to contact his facility.   Eventually clarified ordering provider as Raffi Senior.     Discussed risks and benefits with pt. Five minutes later he did not recall why he was here. In my opinion he is not competent to consent.    Eventually able to contact referring provider who confirmed that this was routine screening. His paperwork stated that pt was competent, however he has also ordered formal cognitive testing which has not been done.    Contacted pts brother Juventino. Stated that nobody from family was formally assigned to make medical decisions. He stated that pt has been having cognitive problems and is unlikely to understand the procedure. I discussed that this is part of routine health care maintenance like other screening tests and vaccines. Juventino stated that Rico does not generally trust doctors and would not consent for vaccines.    Juventino and I agreed to defer the colonoscopy at this time. This can be revisited once his cognitive status is clarified and medical decision maker assigned.    Total time was 1 hour.    MOIRA Gilliam MD  Professor of Medicine  Division of Gastroenterology, Hepatology and Nutrition  [facility]

## 2022-11-08 ENCOUNTER — LAB REQUISITION (OUTPATIENT)
Dept: LAB | Facility: CLINIC | Age: 57
End: 2022-11-08
Payer: COMMERCIAL

## 2022-11-08 LAB — HEMOCCULT STL QL IA: NEGATIVE

## 2022-11-08 PROCEDURE — 82274 ASSAY TEST FOR BLOOD FECAL: CPT | Mod: ORL | Performed by: FAMILY MEDICINE

## 2023-11-09 ENCOUNTER — LAB REQUISITION (OUTPATIENT)
Dept: LAB | Facility: CLINIC | Age: 58
End: 2023-11-09
Payer: COMMERCIAL

## 2023-11-09 DIAGNOSIS — Z13.0 ENCOUNTER FOR SCREENING FOR DISEASES OF THE BLOOD AND BLOOD-FORMING ORGANS AND CERTAIN DISORDERS INVOLVING THE IMMUNE MECHANISM: ICD-10-CM

## 2023-11-10 LAB
ALBUMIN SERPL BCG-MCNC: 3.9 G/DL (ref 3.5–5.2)
ALP SERPL-CCNC: 104 U/L (ref 40–129)
ALT SERPL W P-5'-P-CCNC: 14 U/L (ref 0–70)
ANION GAP SERPL CALCULATED.3IONS-SCNC: 12 MMOL/L (ref 7–15)
AST SERPL W P-5'-P-CCNC: 19 U/L (ref 0–45)
BILIRUB SERPL-MCNC: 0.4 MG/DL
BUN SERPL-MCNC: 23.4 MG/DL (ref 8–23)
CALCIUM SERPL-MCNC: 9.3 MG/DL (ref 8.8–10.2)
CHLORIDE SERPL-SCNC: 107 MMOL/L (ref 98–107)
CREAT SERPL-MCNC: 1.11 MG/DL (ref 0.67–1.17)
DEPRECATED HCO3 PLAS-SCNC: 25 MMOL/L (ref 22–29)
EGFRCR SERPLBLD CKD-EPI 2021: 76 ML/MIN/1.73M2
ERYTHROCYTE [DISTWIDTH] IN BLOOD BY AUTOMATED COUNT: 12.5 % (ref 10–15)
GLUCOSE SERPL-MCNC: 99 MG/DL (ref 70–99)
HCT VFR BLD AUTO: 44.3 % (ref 40–53)
HGB BLD-MCNC: 14.6 G/DL (ref 13.3–17.7)
MCH RBC QN AUTO: 30.7 PG (ref 26.5–33)
MCHC RBC AUTO-ENTMCNC: 33 G/DL (ref 31.5–36.5)
MCV RBC AUTO: 93 FL (ref 78–100)
PLATELET # BLD AUTO: 217 10E3/UL (ref 150–450)
POTASSIUM SERPL-SCNC: 4 MMOL/L (ref 3.4–5.3)
PROT SERPL-MCNC: 7.1 G/DL (ref 6.4–8.3)
RBC # BLD AUTO: 4.75 10E6/UL (ref 4.4–5.9)
SODIUM SERPL-SCNC: 144 MMOL/L (ref 135–145)
WBC # BLD AUTO: 6.5 10E3/UL (ref 4–11)

## 2023-11-10 PROCEDURE — 36415 COLL VENOUS BLD VENIPUNCTURE: CPT | Mod: ORL | Performed by: NURSE PRACTITIONER

## 2023-11-10 PROCEDURE — 85027 COMPLETE CBC AUTOMATED: CPT | Mod: ORL | Performed by: NURSE PRACTITIONER

## 2023-11-10 PROCEDURE — 80053 COMPREHEN METABOLIC PANEL: CPT | Mod: ORL | Performed by: NURSE PRACTITIONER

## 2023-11-10 PROCEDURE — P9604 ONE-WAY ALLOW PRORATED TRIP: HCPCS | Mod: ORL | Performed by: NURSE PRACTITIONER

## 2024-02-24 ENCOUNTER — LAB REQUISITION (OUTPATIENT)
Dept: LAB | Facility: CLINIC | Age: 59
End: 2024-02-24

## 2024-02-24 DIAGNOSIS — R82.90 UNSPECIFIED ABNORMAL FINDINGS IN URINE: ICD-10-CM

## 2024-07-15 ENCOUNTER — LAB REQUISITION (OUTPATIENT)
Dept: LAB | Facility: CLINIC | Age: 59
End: 2024-07-15
Payer: COMMERCIAL

## 2024-07-15 ENCOUNTER — APPOINTMENT (OUTPATIENT)
Dept: LAB | Facility: CLINIC | Age: 59
End: 2024-07-15
Payer: COMMERCIAL

## 2024-07-15 ENCOUNTER — OFFICE VISIT (OUTPATIENT)
Dept: UROLOGY | Facility: CLINIC | Age: 59
End: 2024-07-15
Payer: COMMERCIAL

## 2024-07-15 VITALS
DIASTOLIC BLOOD PRESSURE: 72 MMHG | HEIGHT: 66 IN | SYSTOLIC BLOOD PRESSURE: 124 MMHG | BODY MASS INDEX: 29.89 KG/M2 | WEIGHT: 186 LBS

## 2024-07-15 DIAGNOSIS — N40.1 BENIGN PROSTATIC HYPERPLASIA WITH URINARY RETENTION: Primary | ICD-10-CM

## 2024-07-15 DIAGNOSIS — R97.20 ELEVATED PROSTATE SPECIFIC ANTIGEN (PSA): ICD-10-CM

## 2024-07-15 DIAGNOSIS — R33.8 BENIGN PROSTATIC HYPERPLASIA WITH URINARY RETENTION: Primary | ICD-10-CM

## 2024-07-15 DIAGNOSIS — R79.89 OTHER SPECIFIED ABNORMAL FINDINGS OF BLOOD CHEMISTRY: ICD-10-CM

## 2024-07-15 PROCEDURE — 84153 ASSAY OF PSA TOTAL: CPT | Performed by: STUDENT IN AN ORGANIZED HEALTH CARE EDUCATION/TRAINING PROGRAM

## 2024-07-15 PROCEDURE — 99214 OFFICE O/P EST MOD 30 MIN: CPT | Performed by: STUDENT IN AN ORGANIZED HEALTH CARE EDUCATION/TRAINING PROGRAM

## 2024-07-15 PROCEDURE — 36415 COLL VENOUS BLD VENIPUNCTURE: CPT | Performed by: STUDENT IN AN ORGANIZED HEALTH CARE EDUCATION/TRAINING PROGRAM

## 2024-07-15 PROCEDURE — 80048 BASIC METABOLIC PNL TOTAL CA: CPT | Performed by: STUDENT IN AN ORGANIZED HEALTH CARE EDUCATION/TRAINING PROGRAM

## 2024-07-15 RX ORDER — TIOTROPIUM BROMIDE INHALATION SPRAY 3.12 UG/1
2 SPRAY, METERED RESPIRATORY (INHALATION) DAILY
COMMUNITY
Start: 2024-07-08

## 2024-07-15 RX ORDER — FINASTERIDE 5 MG/1
5 TABLET, FILM COATED ORAL DAILY
Qty: 90 TABLET | Refills: 3 | Status: SHIPPED | OUTPATIENT
Start: 2024-07-15 | End: 2025-07-10

## 2024-07-15 RX ORDER — TAMSULOSIN HYDROCHLORIDE 0.4 MG/1
0.4 CAPSULE ORAL DAILY
Qty: 90 CAPSULE | Refills: 3 | Status: SHIPPED | OUTPATIENT
Start: 2024-07-15 | End: 2025-07-10

## 2024-07-15 ASSESSMENT — PAIN SCALES - GENERAL: PAINLEVEL: NO PAIN (0)

## 2024-07-15 NOTE — LETTER
"7/15/2024       RE: Rico Burton  [address] [address]     Dear Colleague,    Thank you for referring your patient, Rico Burton, to the [facility] UROLOGY [facility] [place] at [facility]. Please see a copy of my visit note below.    CHIEF COMPLAINT   It was my pleasure to see Rico Burton who is a 59 year old male for follow-up of BPH with retention and elevated PSA.      HPI:  Rico Burton is a 59 year old male being seen for follow-up.  Duration of problem: Few years on catheter apparently since last 4 months gets changed every month  Previous treatments: On doxazosin 1 mg not taking tamsulosin     accompanied by his  care attendant  Reviewed previous notes  History is very difficult to obtain from Rico considering his limitations  The attending does not know exactly when the catheter was placed but thinks it was about 4 months ago  His nurse changed every month  Looks like he is on doxazosin 1 mg a day.   I am not sure ifIt is helping him or not  Evaluation with me had show prostate 74 g in size and may perhaps benefit with additional drug apart from tamsulosin    Exam:  /72   Ht 1.676 m (5' 6\")   Wt 84.4 kg (186 lb)   BMI 30.02 kg/m    General: age-appropriate appearing male in NAD sitting in a wheelchair  Resp: no respiratory distress  CV: heart rate regular  Abdomen: Degree of obesity is none. Abdomen is soft and nontender. No organomegaly.   :  Avila catheter present  Neuro: grossly non focal. Normal reflexes  Motor: excellent strength throughout    Review of Imaging:  The following imaging exams were independently viewed and interpreted by me and discussed with patient:  MRI Abd/Pelvis: Abnormal:   Narrative & Impression   MRI PROSTATE: 8/19/2021 1:28 PM     CLINICAL HISTORY: Prostate cancer, initial staging, intermediate to  high risk; Elevated prostate specific antigen (PSA)     Most Recent PSA: 5.4 ug/L   "   Comparison: None.     TECHNIQUE:  The following sequences were obtained: High-resolution axial  T2-weighted, coronal T2-weighted, 3D volumetric T2-weighted, axial  pre-contrast T1, axial diffusion-weighted, axial apparent diffusion  coefficient and axial dynamic contrast-enhanced T1. Postcontrast  images were evaluated on a separate workstation to evaluate dynamic  contrast enhancement. The technique of this exam is PI-RADS v2.1  compliant. Contrast dose: Gadavist 7.5 cc     FINDINGS:  Size: 77 grams  Hemorrhage: Absent  Peripheral zone: Heterogeneous on T2-weighted images. No suspicious  lesions identified.  Transition zone: Enlarged with BPH changes. Transition zone nodules  which are circumscribed or mostly encapsulated without diffusion  restriction.  PI-RADS 2.  No highly suspicious nodules.     Neurovascular bundles: No suspicion of involvement by malignancy  Seminal vesicles: Not involved by tumor. Hemorrhage/proteinaceous  content throughout the right seminal vesicle.  Lymph nodes: No adenopathy.  Bones: No suspicious lesions.  Other pelvic organs: No additional findings.                                                                      IMPRESSION:  1. Based on the most suspicious abnormality, this exam is  characterized as PIRADS 2 - Clinically significant cancer is unlikely  to be present.?  2. Prostatomegaly with BPH changes.     PIRADS? v2.1 Assessment Categories   PIRADS 1 ? Very low (clinically significant cancer is highly unlikely  to be present)   PIRADS 2 ? Low (clinically significant cancer is unlikely to be  present)   PIRADS 3 ? Intermediate (the presence of clinically significant cancer  is equivocal)   PIRADS 4 ? High (clinically significant cancer is likely to be  present)   PIRADS 5 ? Very high (clinically significant cancer is highly likely  to be present)              I have personally reviewed the examination and initial interpretation  and I agree with the findings.          Review  of Labs:  The following labs were reviewed by me and discussed with the patient:  Creatinine: Normal  Component      Latest Ref Rng 11/10/2023  9:42 AM   Creatinine      0.67 - 1.17 mg/dL 1.11        PSA: Abnormal:   Component      Latest Ref Rng 11/23/2020  6:34 AM 5/14/2021  7:52 AM 6/18/2021  11:30 AM   PSA      0.0 - 3.5 ng/mL 4.8 (H)  6.61 (H)  5.4 (H)       Legend:  (H) High    Assessment & Plan    Benign prostatic hyperplasia with urinary retention  Urinary retention likely from the enlarged prostate  Had high PSA in the past and would benefit from a repeat assessment  I want him to start on dual drug combination for his BPH in view of the gland size  We will attempt voiding trial in he next week or so  PSA and BMP today , will plan for MRI if PSA still on the higher side    - tamsulosin (FLOMAX) 0.4 MG capsule; Take 1 capsule (0.4 mg) by mouth daily for 360 days  - finasteride (PROSCAR) 5 MG tablet; Take 1 tablet (5 mg) by mouth daily for 360 days  - PSA tumor marker [[id number]]  - Basic metabolic panel [LAB15]      Darien Salguero MD  [facility]OLOG[facility] [place]      ==========================    Additional Billing and Coding Information:  Review of external notes as documented above   Review of the result(s) of each unique test - PSA, Creatinine, MRI prostate        HIstory also obtained from the care attendant as the patient himself is a poor historian.        30 minutes spent by me on the date of the encounter doing chart review, review of test results, interpretation of tests, patient visit, documentation, and discusseion with the care attendant      ==========================

## 2024-07-15 NOTE — NURSING NOTE
Chief Complaint   Patient presents with    Elevated PSA      Pt has catheter in, pt's caregiver states this has been in for about 4 months. Pt gets this changed at living facility.    Andreia Chapa, CMA

## 2024-07-15 NOTE — PROGRESS NOTES
"CHIEF COMPLAINT   It was my pleasure to see Rico Burton who is a 59 year old male for follow-up of BPH with retention and elevated PSA.      HPI:  Rico Burton is a 59 year old male being seen for follow-up.  Duration of problem: Few years on catheter apparently since last 4 months gets changed every month  Previous treatments: On doxazosin 1 mg not taking tamsulosin     accompanied by his  care attendant  Reviewed previous notes  History is very difficult to obtain from Rico considering his limitations  The attending does not know exactly when the catheter was placed but thinks it was about 4 months ago  His nurse changed every month  Looks like he is on doxazosin 1 mg a day.   I am not sure ifIt is helping him or not  Evaluation with me had show prostate 74 g in size and may perhaps benefit with additional drug apart from tamsulosin    Exam:  /72   Ht 1.676 m (5' 6\")   Wt 84.4 kg (186 lb)   BMI 30.02 kg/m    General: age-appropriate appearing male in NAD sitting in a wheelchair  Resp: no respiratory distress  CV: heart rate regular  Abdomen: Degree of obesity is none. Abdomen is soft and nontender. No organomegaly.   :  Avila catheter present  Neuro: grossly non focal. Normal reflexes  Motor: excellent strength throughout    Review of Imaging:  The following imaging exams were independently viewed and interpreted by me and discussed with patient:  MRI Abd/Pelvis: Abnormal:   Narrative & Impression   MRI PROSTATE: 8/19/2021 1:28 PM     CLINICAL HISTORY: Prostate cancer, initial staging, intermediate to  high risk; Elevated prostate specific antigen (PSA)     Most Recent PSA: 5.4 ug/L     Comparison: None.     TECHNIQUE:  The following sequences were obtained: High-resolution axial  T2-weighted, coronal T2-weighted, 3D volumetric T2-weighted, axial  pre-contrast T1, axial diffusion-weighted, axial apparent diffusion  coefficient and axial dynamic contrast-enhanced T1. Postcontrast  images were " evaluated on a separate workstation to evaluate dynamic  contrast enhancement. The technique of this exam is PI-RADS v2.1  compliant. Contrast dose: Gadavist 7.5 cc     FINDINGS:  Size: 77 grams  Hemorrhage: Absent  Peripheral zone: Heterogeneous on T2-weighted images. No suspicious  lesions identified.  Transition zone: Enlarged with BPH changes. Transition zone nodules  which are circumscribed or mostly encapsulated without diffusion  restriction.  PI-RADS 2.  No highly suspicious nodules.     Neurovascular bundles: No suspicion of involvement by malignancy  Seminal vesicles: Not involved by tumor. Hemorrhage/proteinaceous  content throughout the right seminal vesicle.  Lymph nodes: No adenopathy.  Bones: No suspicious lesions.  Other pelvic organs: No additional findings.                                                                      IMPRESSION:  1. Based on the most suspicious abnormality, this exam is  characterized as PIRADS 2 - Clinically significant cancer is unlikely  to be present.?  2. Prostatomegaly with BPH changes.     PIRADS? v2.1 Assessment Categories   PIRADS 1 ? Very low (clinically significant cancer is highly unlikely  to be present)   PIRADS 2 ? Low (clinically significant cancer is unlikely to be  present)   PIRADS 3 ? Intermediate (the presence of clinically significant cancer  is equivocal)   PIRADS 4 ? High (clinically significant cancer is likely to be  present)   PIRADS 5 ? Very high (clinically significant cancer is highly likely  to be present)              I have personally reviewed the examination and initial interpretation  and I agree with the findings.          Review of Labs:  The following labs were reviewed by me and discussed with the patient:  Creatinine: Normal  Component      Latest Ref [facility] 11/10/2023  9:42 AM   Creatinine      0.67 - 1.17 mg/dL 1.11        PSA: Abnormal:   Component      Latest Ref [facility] 11/23/2020  6:34 AM 5/14/2021  7:52 AM 6/18/2021  11:30 AM   PSA       0.0 - 3.5 ng/mL 4.8 (H)  6.61 (H)  5.4 (H)       Legend:  (H) High    Assessment & Plan     Benign prostatic hyperplasia with urinary retention  Urinary retention likely from the enlarged prostate  Had high PSA in the past and would benefit from a repeat assessment  I want him to start on dual drug combination for his BPH in view of the gland size  We will attempt voiding trial in he next week or so  PSA and BMP today , will plan for MRI if PSA still on the higher side    - tamsulosin (FLOMAX) 0.4 MG capsule; Take 1 capsule (0.4 mg) by mouth daily for 360 days  - finasteride (PROSCAR) 5 MG tablet; Take 1 tablet (5 mg) by mouth daily for 360 days  - PSA tumor marker [[id number]]  - Basic metabolic panel [LAB15]      Darien Salguero MD  [facility] [place]      ==========================    Additional Billing and Coding Information:  Review of external notes as documented above   Review of the result(s) of each unique test - PSA, Creatinine, MRI prostate        HIstory also obtained from the care attendant as the patient himself is a poor historian.        30 minutes spent by me on the date of the encounter doing chart review, review of test results, interpretation of tests, patient visit, documentation, and discusseion with the care attendant      ==========================

## 2024-07-15 NOTE — PATIENT INSTRUCTIONS
Stop Doxazosin  Start Tamsulosin and finasteride  Voiding trial if possible in 1-2 weeks  PSA to be done and possible MRI prostate  Consider for outlet procedure if feasible

## 2024-07-16 LAB
ANION GAP SERPL CALCULATED.3IONS-SCNC: 9 MMOL/L (ref 7–15)
BUN SERPL-MCNC: 22.4 MG/DL (ref 8–23)
CALCIUM SERPL-MCNC: 9.3 MG/DL (ref 8.8–10.4)
CHLORIDE SERPL-SCNC: 106 MMOL/L (ref 98–107)
CREAT SERPL-MCNC: 1.2 MG/DL (ref 0.67–1.17)
EGFRCR SERPLBLD CKD-EPI 2021: 69 ML/MIN/1.73M2
GLUCOSE SERPL-MCNC: 98 MG/DL (ref 70–99)
HCO3 SERPL-SCNC: 29 MMOL/L (ref 22–29)
POTASSIUM SERPL-SCNC: 4.2 MMOL/L (ref 3.4–5.3)
PSA SERPL DL<=0.01 NG/ML-MCNC: 9.67 NG/ML (ref 0–4.5)
SODIUM SERPL-SCNC: 144 MMOL/L (ref 135–145)

## 2024-07-17 LAB
ANION GAP SERPL CALCULATED.3IONS-SCNC: 12 MMOL/L (ref 7–15)
BUN SERPL-MCNC: 22.3 MG/DL (ref 8–23)
CALCIUM SERPL-MCNC: 9 MG/DL (ref 8.8–10.4)
CHLORIDE SERPL-SCNC: 107 MMOL/L (ref 98–107)
CREAT SERPL-MCNC: 1.08 MG/DL (ref 0.67–1.17)
EGFRCR SERPLBLD CKD-EPI 2021: 78 ML/MIN/1.73M2
GLUCOSE SERPL-MCNC: 114 MG/DL (ref 70–99)
HCO3 SERPL-SCNC: 26 MMOL/L (ref 22–29)
POTASSIUM SERPL-SCNC: 3.4 MMOL/L (ref 3.4–5.3)
PSA SERPL DL<=0.01 NG/ML-MCNC: 6.83 NG/ML (ref 0–4.5)
SODIUM SERPL-SCNC: 145 MMOL/L (ref 135–145)

## 2024-07-17 PROCEDURE — 36415 COLL VENOUS BLD VENIPUNCTURE: CPT | Mod: ORL | Performed by: FAMILY MEDICINE

## 2024-07-17 PROCEDURE — P9604 ONE-WAY ALLOW PRORATED TRIP: HCPCS | Mod: ORL | Performed by: FAMILY MEDICINE

## 2024-07-17 PROCEDURE — G0103 PSA SCREENING: HCPCS | Mod: ORL | Performed by: FAMILY MEDICINE

## 2024-07-17 PROCEDURE — 80048 BASIC METABOLIC PNL TOTAL CA: CPT | Mod: ORL | Performed by: FAMILY MEDICINE

## 2024-07-19 DIAGNOSIS — R97.20 ELEVATED PROSTATE SPECIFIC ANTIGEN (PSA): Primary | ICD-10-CM

## 2024-07-25 ENCOUNTER — ALLIED HEALTH/NURSE VISIT (OUTPATIENT)
Dept: UROLOGY | Facility: CLINIC | Age: 59
End: 2024-07-25

## 2024-07-25 DIAGNOSIS — Z79.2 PROPHYLACTIC ANTIBIOTIC: Primary | ICD-10-CM

## 2024-07-25 PROCEDURE — 99207 PR NO CHARGE NURSE ONLY: CPT

## 2024-07-25 RX ORDER — CIPROFLOXACIN 500 MG/1
500 TABLET, FILM COATED ORAL ONCE
Qty: 1 TABLET | Refills: 0 | Status: CANCELLED | OUTPATIENT
Start: 2024-07-25 | End: 2024-07-25

## 2024-07-25 RX ORDER — CIPROFLOXACIN 500 MG/1
500 TABLET, FILM COATED ORAL ONCE
Qty: 1 TABLET | Refills: 0 | Status: SHIPPED | OUTPATIENT
Start: 2024-07-25 | End: 2024-07-25

## 2024-07-25 NOTE — PROGRESS NOTES
Rico Burton comes into clinic today for Urinary Retention  at the request of Dr. Salguero, Ordering Provider for Trial of Void.      Patient's catheter was disconnected from leg bag, a sterile tip syringe was attached to catheter end. 100 mL of sterile water was instilled via gravity into the bladder, when water started coming out in a strong stream from around the catheter.  50 more mL was attempted to be instilled, but started coming out immediately from around the catheter again.  Removed Sahu catheter after deflating balloon completely.    Post Void Residual result by bladder scan : 69 mL    Patient allowed to go home without catheter. Written instructions provided for his care facility to perform bladder scans twice daily, to straight cath pt if PVR >500 mL or if patient is uncomfortable, and to place a sahu if they have to straight cath more than twice.  Patient's aide who is here with him today confirms the facility can do this.    Pt sent 1 tablet Cipro 500 mg to pharmacy to take today    This service provided today was under the supervising provider of the day Dr. Rodriguez, who was available if needed.    Nitza Guallpa, EMT

## 2024-09-13 ENCOUNTER — ANCILLARY PROCEDURE (OUTPATIENT)
Dept: MRI IMAGING | Facility: CLINIC | Age: 59
End: 2024-09-13
Attending: STUDENT IN AN ORGANIZED HEALTH CARE EDUCATION/TRAINING PROGRAM
Payer: COMMERCIAL

## 2024-09-13 DIAGNOSIS — R97.20 ELEVATED PROSTATE SPECIFIC ANTIGEN (PSA): ICD-10-CM

## 2024-09-13 PROCEDURE — 72197 MRI PELVIS W/O & W/DYE: CPT | Performed by: RADIOLOGY

## 2024-09-13 PROCEDURE — A9585 GADOBUTROL INJECTION: HCPCS | Mod: JW | Performed by: RADIOLOGY

## 2024-09-13 RX ORDER — GADOBUTROL 604.72 MG/ML
10 INJECTION INTRAVENOUS ONCE
Status: COMPLETED | OUTPATIENT
Start: 2024-09-13 | End: 2024-09-13

## 2024-09-13 RX ADMIN — GADOBUTROL 8 ML: 604.72 INJECTION INTRAVENOUS at 11:43

## 2024-09-13 NOTE — DISCHARGE INSTRUCTIONS
MRI Contrast Discharge Instructions    The IV contrast you received today will pass out of your body in your  urine. This will happen in the next 24 hours. You will not feel this process.  Your urine will not change color.    Drink at least 4 extra glasses of water or juice today (unless your doctor  has restricted your fluids). This reduces the stress on your kidneys.  You may take your regular medicines.    If you are on dialysis: It is best to have dialysis today.    If you have a reaction: Most reactions happen right away. If you have  any new symptoms after leaving the hospital (such as hives or swelling),  call your hospital at the correct number below. Or call your family doctor.  If you have breathing distress or wheezing, call 911.    Special instructions: ***    I have read and understand the above information.    Signature:______________________________________ Date:___________    Staff:__________________________________________ Date:___________     Time:__________    [facility] Departments:    ___[place]: [phone]  ___[facility]: [phone]  ___[place]: [phone] ___[facility]: [phone]  ___[facility]: [phone]  ___[facility]: [phone]  ___[phone]  ___[facility]: [phone]  ___[phone]

## 2024-11-26 ENCOUNTER — LAB REQUISITION (OUTPATIENT)
Dept: LAB | Facility: CLINIC | Age: 59
End: 2024-11-26
Payer: COMMERCIAL

## 2024-11-26 DIAGNOSIS — D64.9 ANEMIA, UNSPECIFIED: ICD-10-CM

## 2024-11-26 DIAGNOSIS — R97.20 ELEVATED PROSTATE SPECIFIC ANTIGEN (PSA): ICD-10-CM

## 2024-11-26 DIAGNOSIS — E78.5 HYPERLIPIDEMIA, UNSPECIFIED: ICD-10-CM

## 2024-11-27 LAB
ANION GAP SERPL CALCULATED.3IONS-SCNC: 9 MMOL/L (ref 7–15)
BASOPHILS # BLD AUTO: 0.1 10E3/UL (ref 0–0.2)
BASOPHILS NFR BLD AUTO: 1 %
BUN SERPL-MCNC: 16.3 MG/DL (ref 8–23)
CALCIUM SERPL-MCNC: 9.1 MG/DL (ref 8.8–10.4)
CHLORIDE SERPL-SCNC: 104 MMOL/L (ref 98–107)
CREAT SERPL-MCNC: 1.03 MG/DL (ref 0.67–1.17)
EGFRCR SERPLBLD CKD-EPI 2021: 83 ML/MIN/1.73M2
EOSINOPHIL # BLD AUTO: 0.4 10E3/UL (ref 0–0.7)
EOSINOPHIL NFR BLD AUTO: 6 %
ERYTHROCYTE [DISTWIDTH] IN BLOOD BY AUTOMATED COUNT: 12.6 % (ref 10–15)
GLUCOSE SERPL-MCNC: 78 MG/DL (ref 70–99)
HCO3 SERPL-SCNC: 28 MMOL/L (ref 22–29)
HCT VFR BLD AUTO: 42.1 % (ref 40–53)
HGB BLD-MCNC: 14.1 G/DL (ref 13.3–17.7)
IMM GRANULOCYTES # BLD: 0 10E3/UL
IMM GRANULOCYTES NFR BLD: 0 %
LYMPHOCYTES # BLD AUTO: 1.3 10E3/UL (ref 0.8–5.3)
LYMPHOCYTES NFR BLD AUTO: 22 %
MCH RBC QN AUTO: 30.7 PG (ref 26.5–33)
MCHC RBC AUTO-ENTMCNC: 33.5 G/DL (ref 31.5–36.5)
MCV RBC AUTO: 92 FL (ref 78–100)
MONOCYTES # BLD AUTO: 0.5 10E3/UL (ref 0–1.3)
MONOCYTES NFR BLD AUTO: 9 %
NEUTROPHILS # BLD AUTO: 3.7 10E3/UL (ref 1.6–8.3)
NEUTROPHILS NFR BLD AUTO: 62 %
NRBC # BLD AUTO: 0 10E3/UL
NRBC BLD AUTO-RTO: 0 /100
PLATELET # BLD AUTO: 209 10E3/UL (ref 150–450)
POTASSIUM SERPL-SCNC: 3.9 MMOL/L (ref 3.4–5.3)
PSA SERPL DL<=0.01 NG/ML-MCNC: 3.31 NG/ML (ref 0–4.5)
RBC # BLD AUTO: 4.59 10E6/UL (ref 4.4–5.9)
SODIUM SERPL-SCNC: 141 MMOL/L (ref 135–145)
WBC # BLD AUTO: 6 10E3/UL (ref 4–11)

## 2024-11-27 PROCEDURE — P9603 ONE-WAY ALLOW PRORATED MILES: HCPCS | Mod: ORL

## 2024-11-27 PROCEDURE — 82947 ASSAY GLUCOSE BLOOD QUANT: CPT | Mod: ORL | Performed by: NURSE PRACTITIONER

## 2024-11-27 PROCEDURE — 80048 BASIC METABOLIC PNL TOTAL CA: CPT | Mod: ORL | Performed by: NURSE PRACTITIONER

## 2024-11-27 PROCEDURE — G0103 PSA SCREENING: HCPCS | Mod: ORL | Performed by: NURSE PRACTITIONER

## 2024-11-27 PROCEDURE — P9603 ONE-WAY ALLOW PRORATED MILES: HCPCS | Mod: ORL | Performed by: NURSE PRACTITIONER

## 2024-11-27 PROCEDURE — 85004 AUTOMATED DIFF WBC COUNT: CPT | Mod: ORL | Performed by: NURSE PRACTITIONER

## 2024-11-27 PROCEDURE — 85025 COMPLETE CBC W/AUTO DIFF WBC: CPT | Mod: ORL

## 2024-11-27 PROCEDURE — 36415 COLL VENOUS BLD VENIPUNCTURE: CPT | Mod: ORL

## 2024-11-27 PROCEDURE — G0103 PSA SCREENING: HCPCS | Mod: ORL

## 2024-11-27 PROCEDURE — 80048 BASIC METABOLIC PNL TOTAL CA: CPT | Mod: ORL

## 2024-11-27 PROCEDURE — 36415 COLL VENOUS BLD VENIPUNCTURE: CPT | Mod: ORL | Performed by: NURSE PRACTITIONER

## 2024-11-27 PROCEDURE — 82374 ASSAY BLOOD CARBON DIOXIDE: CPT | Mod: ORL | Performed by: NURSE PRACTITIONER

## 2025-03-18 ENCOUNTER — LAB REQUISITION (OUTPATIENT)
Dept: LAB | Facility: CLINIC | Age: 60
End: 2025-03-18
Payer: COMMERCIAL

## 2025-03-18 DIAGNOSIS — R82.90 UNSPECIFIED ABNORMAL FINDINGS IN URINE: ICD-10-CM

## 2025-03-18 DIAGNOSIS — R39.11 HESITANCY OF MICTURITION: ICD-10-CM

## 2025-03-18 LAB
ALBUMIN UR-MCNC: 30 MG/DL
APPEARANCE UR: ABNORMAL
BACTERIA #/AREA URNS HPF: ABNORMAL /HPF
BILIRUB UR QL STRIP: NEGATIVE
COLOR UR AUTO: YELLOW
GLUCOSE UR STRIP-MCNC: NEGATIVE MG/DL
HGB UR QL STRIP: ABNORMAL
KETONES UR STRIP-MCNC: ABNORMAL MG/DL
LEUKOCYTE ESTERASE UR QL STRIP: NEGATIVE
MUCOUS THREADS #/AREA URNS LPF: PRESENT /LPF
NITRATE UR QL: NEGATIVE
PH UR STRIP: 5.5 [PH] (ref 5–7)
RBC URINE: 58 /HPF
SP GR UR STRIP: 1.02 (ref 1–1.03)
UROBILINOGEN UR STRIP-MCNC: NORMAL MG/DL
WBC URINE: 3 /HPF

## 2025-03-18 PROCEDURE — 81001 URINALYSIS AUTO W/SCOPE: CPT | Mod: ORL

## 2025-03-18 PROCEDURE — 87086 URINE CULTURE/COLONY COUNT: CPT | Mod: ORL

## 2025-03-20 LAB — BACTERIA UR CULT: NO GROWTH

## 2025-04-01 ENCOUNTER — ANCILLARY ORDERS (OUTPATIENT)
Dept: GENERAL RADIOLOGY | Facility: CLINIC | Age: 60
End: 2025-04-01

## 2025-04-01 ENCOUNTER — TRANSCRIBE ORDERS (OUTPATIENT)
Dept: OTHER | Age: 60
End: 2025-04-01

## 2025-04-01 DIAGNOSIS — R13.10 DYSPHAGIA: Primary | ICD-10-CM

## 2025-05-13 ENCOUNTER — OFFICE VISIT (OUTPATIENT)
Dept: UROLOGY | Facility: CLINIC | Age: 60
End: 2025-05-13
Payer: COMMERCIAL

## 2025-05-13 VITALS
HEART RATE: 62 BPM | SYSTOLIC BLOOD PRESSURE: 122 MMHG | OXYGEN SATURATION: 93 % | DIASTOLIC BLOOD PRESSURE: 80 MMHG | BODY MASS INDEX: 24.91 KG/M2 | HEIGHT: 66 IN | WEIGHT: 155 LBS

## 2025-05-13 DIAGNOSIS — N40.1 BENIGN PROSTATIC HYPERPLASIA WITH URINARY RETENTION: Primary | ICD-10-CM

## 2025-05-13 DIAGNOSIS — R33.8 BENIGN PROSTATIC HYPERPLASIA WITH URINARY RETENTION: Primary | ICD-10-CM

## 2025-05-13 PROCEDURE — 99213 OFFICE O/P EST LOW 20 MIN: CPT | Performed by: UROLOGY

## 2025-05-13 ASSESSMENT — PAIN SCALES - GENERAL: PAINLEVEL_OUTOF10: NO PAIN (0)

## 2025-05-13 NOTE — LETTER
5/13/2025       RE: Rico Burton  [address] [address]     Dear Colleague,    Thank you for referring your patient, Rico Burton, to the [facility] [place] at [facility]. Please see a copy of my visit note below.    Assessment & Plan  ASSESSMENT and PLAN  60 year old male here for consideration of outlet procedure.  Has retention, but not bothered by catheter and not able to provide informed consent.    BPH with urinary retention, managed with urethral catheter  Elevated PSA    Options are keep urethral catheter in place or take to OR for suprapubic catheter placement or HoLEP.    Given he is content with catheter without pain or infections, I think this may be best option.  Will find out his POA/next of kin and discuss further.    Recommendations:  -Continue monthly catheter exchanges  -Contact back if recurrent UTIs, hematuria or urethral erosion  -Urine culture should not be checked for blood, cloudy or foul smelling urine.  Only if there are symptoms (increased confusion, bladder pain/spasms) then urine culture should be done.    Time spent: 20 minutes spent on the date of the encounter doing chart review, history and exam, documentation and further activities as noted above.    Talat Stark MD   Urology  [facility]         Subjective    CHIEF COMPLAINT   follow-up of Urinary retention.  Limited History obtained from patient's attendant who brought him to visit.    HPI   Rico Burton is a very pleasant 60 year old male who presents with a history of recurrent urinary retention likely due to BPH.  Followed by Dr. Salguero for this and elevated PSA.  On tamsulosin and finasteride.    Currently in retention and has a catheter in place that is exchanged regularly.    Prostate 85 ml on prostate MRI.  Cysto by Dr. Salguero on 4/17/2025 showed bilobar hypertrophy.    Doing well with catheter.  Patient not  "bothered by this.  Does have limited ability to comprehend medical care and make decisions.       Objective    PHYSICAL EXAM  Patient is a 60 year old  male   Vitals: Blood pressure 122/80, pulse 62, height 1.676 m (5' 6\"), weight 70.3 kg (155 lb), SpO2 93%.  Body mass index is 25.02 kg/m .  General: no acute distress           Again, thank you for allowing me to participate in the care of your patient.      Sincerely,    Talat Stark MD    "

## 2025-05-13 NOTE — NURSING NOTE
Chief Complaint   Patient presents with    Benign Prostatic Hypertrophy    Urinary Retention     Here to talk about the holep    Patient doing well catheter is good pr nursing staff from the home   Tacho Cordon, CMA

## 2025-05-13 NOTE — PATIENT INSTRUCTIONS
I will discuss the options with your decision maker regarding leaving the catheter in or doing a surgery (Holmium laser enucleation of the prostate).    The catheter should be changed every month, and this can be with a nurse at your nursing home.    Urine culture should not be checked for blood, cloudy or foul smelling urine.  Only if there are symptoms (increased confusion, bladder pain/spasms) then urine culture should be done.

## 2025-05-13 NOTE — PROGRESS NOTES
"Assessment & Plan   ASSESSMENT and PLAN  60 year old male here for consideration of outlet procedure.  Has retention, but not bothered by catheter and not able to provide informed consent.    BPH with urinary retention, managed with urethral catheter  Elevated PSA    Options are keep urethral catheter in place or take to OR for suprapubic catheter placement or HoLEP.    Given he is content with catheter without pain or infections, I think this may be best option.  Will find out his POA/next of kin and discuss further.    Recommendations:  -Continue monthly catheter exchanges  -Contact back if recurrent UTIs, hematuria or urethral erosion  -Urine culture should not be checked for blood, cloudy or foul smelling urine.  Only if there are symptoms (increased confusion, bladder pain/spasms) then urine culture should be done.    Time spent: 20 minutes spent on the date of the encounter doing chart review, history and exam, documentation and further activities as noted above.    Talat Stark MD   Urology  [facility]         Subjective     CHIEF COMPLAINT   follow-up of Urinary retention.  Limited History obtained from patient's attendant who brought him to visit.    HPI   Rico Burton is a very pleasant 60 year old male who presents with a history of recurrent urinary retention likely due to BPH.  Followed by Dr. Salguero for this and elevated PSA.  On tamsulosin and finasteride.    Currently in retention and has a catheter in place that is exchanged regularly.    Prostate 85 ml on prostate MRI.  Cysto by Dr. Salguero on 4/17/2025 showed bilobar hypertrophy.    Doing well with catheter.  Patient not bothered by this.  Does have limited ability to comprehend medical care and make decisions.       Objective     PHYSICAL EXAM  Patient is a 60 year old  male   Vitals: Blood pressure 122/80, pulse 62, height 1.676 m (5' 6\"), weight 70.3 kg (155 lb), SpO2 93%.  Body mass index is 25.02 kg/m .  General: no " acute distress

## 2025-05-20 ENCOUNTER — HOSPITAL ENCOUNTER (OUTPATIENT)
Dept: GENERAL RADIOLOGY | Facility: CLINIC | Age: 60
Discharge: HOME | End: 2025-05-20
Payer: COMMERCIAL

## 2025-05-20 ENCOUNTER — HOSPITAL ENCOUNTER (OUTPATIENT)
Dept: SPEECH THERAPY | Facility: CLINIC | Age: 60
Discharge: HOME | End: 2025-05-20
Payer: COMMERCIAL

## 2025-05-20 DIAGNOSIS — R13.10 DYSPHAGIA: ICD-10-CM

## 2025-05-20 PROCEDURE — 92611 MOTION FLUOROSCOPY/SWALLOW: CPT | Mod: GN | Performed by: SPEECH-LANGUAGE PATHOLOGIST

## 2025-05-20 PROCEDURE — 74230 X-RAY XM SWLNG FUNCJ C+: CPT

## 2025-05-20 RX ORDER — BARIUM SULFATE 400 MG/ML
SUSPENSION ORAL ONCE
Status: COMPLETED | OUTPATIENT
Start: 2025-05-20 | End: 2025-05-20

## 2025-05-20 RX ADMIN — BARIUM SULFATE 5 ML: 400 SUSPENSION ORAL at 10:45

## 2025-05-20 NOTE — PROGRESS NOTES
SPEECH LANGUAGE PATHOLOGY EVALUATION    Presenting condition or subjective complaint: Patient is a 60 year old male who presented for a video swallow study and was unable to provide any history including want type of foods or liquids he drinks at his facility.    Date of onset: 04/16/21 (first swallow study)  Relevant medical history:   PAST MEDICAL HISTORY:   Past Medical History:   Diagnosis Date    Acute blood loss anemia 09/07/2016    Formatting of this note might be different from the original. Transfused one unit prbc on 9/6/16.  Was having bloody secretions for several days.  No dramatic drop in hgb.    Acute cephalic vein thrombosis, right 09/07/2016    Formatting of this note might be different from the original. By doppler 8/22/16.  No change on 9/2/16 follow up.    Acute deep vein thrombosis (DVT) of tibial vein of right lower extremity (H) 09/07/2016    Formatting of this note might be different from the original. Treated with heparin for about two weeks.  Bloody pulmonary secretions increased on IV heparin.  I felt like clot limited to calf vein was low risk for pulmonary embolism.  Because of bloody secretions, bad gas exchange I stopped heparin IV on 9/5/16, and just treated with SQ heparin preventative dose.  I felt risk of PE was too low to     Acute respiratory failure with hypoxia (H) 08/17/2016    Adult failure to thrive 10/26/2020    MAMIE (acute kidney injury) 08/17/2016    Alcohol abuse     Aortic dissection (H) 10/26/2020    C. difficile colitis 08/22/2016    Formatting of this note might be different from the original. Started po flagyl 8/21/16.  Change to po vanco on 9/5/16 due to continued diarrhea.  Recommend tx with po vanco for 10 days.    CAP (community acquired pneumonia) 08/22/2016    Formatting of this note might be different from the original. S. Pneumonia in sputum on 8/17/16 with diffuse infiltrates.  Was treated with appropriate abx.    Chronic thoracic aortic dissection (H)  10/26/2020    Essential hypertension 08/17/2016    Hypertensive emergency 08/13/2016    Metabolic encephalopathy 08/17/2016    Methamphetamine abuse (H) 08/17/2016    Pulmonary hemorrhage 09/07/2016    Formatting of this note might be different from the original. Frequent bloody pulmonary secretions on suctioing.  Bronchoscopy on 9/6/16 did not show benitez central lesion to explain bleeding.    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    FAMILY HISTORY: No family history on file.    SOCIAL HISTORY:   Social History     Tobacco Use    Smoking status: Former     Types: Cigarettes    Smokeless tobacco: Not on file   Substance Use Topics    Alcohol use: Not Currently        Prior diagnostic imaging/testing results: Patient was seen for a video swallow study on 04/16/21: Rico Burton demonstrates moderately severe oropharyngeal dysphagia as characterized by aspiration on thin and nectar thick liquids which is not cleared with cued cough. He demonstrates premature spillage of thin and nectar thick liquid which move toward the pharynx and into the laryngeal vestibule causing aspiration prior to the swallow. The amount of aspiration is small but does increase over time and is not cleared from airway after cued coughing. No pharyngeal residue noted after the completed swallow. Rico is unable to follow directions or maintain any safe swallow strategies due to cognitive dysfunction. He demonstrates penetration on honey thick liquid trials. No aspiration or penetration noted on puree or solid consistency trials. He has limited dentition for mastication of solids so this was significantly prolonged. No oral stasis after the completed swallow demonstrated. Unfortunately, Rico's PEG seems to have already been pulled out and due to his high risk for aspiration pneumonia, he would benefit from honey thick liquids  and soft moist solids. Pills whole in puree. Sit upright during po intake and limit distractions to optimize safe swallowing.       Prior therapy history for the same diagnosis, illness or injury:         Objective     SWALLOW EVALUTION    VIDEOFLUOROSCOPIC SWALLOW STUDY  Radiologist: Dr. Guan  Views Taken: left lateral   Physical location of procedure: [facility]  VFSS textures trialed:   VFSS Eval: Mildly Thick Liquids  Mode of Presentation: cup, spoon, self-fed   Order of Presentation: 1 5  Preparatory Phase: poor bolus control  Oral Phase: impaired AP movement, premature pharyngeal entry  Bolus Location When Swallow Initiated: pyriforms  Pharyngeal Phase: impaired epiglottic movement, Delayed swallow response  Rosenbeck's Penetration Aspiration Scale: 7 - contrast passes glottis, visible subglottic residue remains despite patient's response (aspiration)  Response to Aspiration: unproductive reflexive cough  Strategies and Compensations: not applicable  Diagnostic Statement: Poor bolus control with poor incoordinated AP movement, premature entry and delayed swallow response resulting in nereida aspiration before and during the swallow. Cough response with weak and delayed.     VFSS Eval: Moderately Thick Liquids  Mode of Presentation: cup, spoon, self-fed   Order of Presentation: 2 3 4 8  Preparatory Phase: poor bolus control  Oral Phase: impaired AP movement, premature pharyngeal entry  Bolus Location When Swallow Initiated: posterior laryngeal surface of epiglottis  Pharyngeal Phase: impaired epiglottic movement, Delayed swallow response  Rosenbeck s Penetration Aspiration Scale: 2 - contrast enters airway, remains above the vocal cords, no residue remains (penetration)  Response to Aspiration: N/A  Strategies and Compensations: not applicable  Diagnostic Statement: Poor AP movement of the bolus with premature entry to the tip of the epiglottis with flash penetration x1 via the cup. None via  the spoon.     VFSS Eval: Purees  Mode of Presentation: spoon, self-fed   Order of Presentation: 6  Preparatory Phase: prolonged bolus preparation poor bolus control  Oral Phase: impaired AP movement, premature pharyngeal entry  Bolus Location When Swallow Initiated: valleculae  Pharyngeal Phase: impaired epiglottic movement  Rosenbeck s Penetration Aspiration Scale: 1 - no aspiration, contrast does not enter airway  Response to Aspiration: N/A  Strategies and Compensations: not applicable  Diagnostic Statement: Prolonged oral phase with rocking of the bolus and reduced AP movement with premature entry, incomplete epiglottic inversion, without pharyngeal retention.      VFSS Eval: Soft & Bite Sized  Mode of Presentation: spoon, self-fed   Order of Presentation: 7  Preparatory Phase: prolonged bolus preparation, insufficient mastication, poor bolus control  Oral Phase: impaired AP movement, residue in oral cavity, premature pharyngeal entry  Bolus Location When Swallow Initiated: valleculae  Pharyngeal Phase: impaired epiglottic movement, impaired tongue base retraction  Rosenbeck s Penetration Aspiration Scale: 1 - no aspiration, contrast does not enter airway  Response to Aspiration: N/A  Strategies and Compensations: not applicable  Diagnostic Statement: Mastication was insufficient for semi-solids with decreased oral awareness, incoordination of AP movement with piecemeal deglutition falling into the pharynx. No penetration or aspiration but is considered a high risk for aspiration.      ESOPHAGEAL PHASE OF SWALLOW  Unable to assess.      SWALLOW ASSESSMENT CLINICAL IMPRESSIONS AND RATIONALE  Assessment & Plan   CLINICAL IMPRESSIONS   Medical Diagnosis: respiratory failure and failure to thrive  Treatment Diagnosis: dysphagia  Impression/Assessment: Patient presents with moderate to severe oral and pharyngeal dysphagia on today's study characterized by poor bolus control/coordination during AP movement with  premature entry of mildly thick liquids, delayed swallow response resulting in nereida large amount of aspiration before and during the swallow with a weak delayed cough in response that did not clear the airway. There was flash penetration of moderately thick liquids on 1/2 swallows by the cup and no penetration via the spoon. Mastication was insufficient for semi-solids highly unorganized, poor AP movement and piecemeal deglutition. Pieces falling to the pharynx. Reduced oral awareness during oral prep. No penetration or aspiration, but at high risk for pieces falling into the open airway. Given his cognitive impairment he is unable to follow any swallow strategies.   Recommend: 1. IDDSI level 4 pureed and Moderately thick liquids (honey). 2. Upright, no straws, small bites/sips and alternate liquids/solids as needed. Medications safest if crushed and placed in puree. If unable to crush whole in puree as tolerated.      Recommended Referrals to Other Professionals: N/A  Education Assessment: Patient provided education after the study. Written information provided to the facility due to his inability to recall information.    Risks and benefits of evaluation/treatment have been explained.   Patient/Family/caregiver agrees with Plan of Care.   Evaluation Time: 30    Signing Clinician: Janie Davila, SLP  [facility]                                                                                   PATIENCE Mcmillan                     Referring Provider:  Demi Mahoney

## 2025-05-20 NOTE — ADDENDUM NOTE
Encounter addended by: Janie Davila, SLP on: 5/20/2025 12:00 PM   Actions taken: Document created, Document edited

## (undated) RX ORDER — LIDOCAINE HYDROCHLORIDE 20 MG/ML
SOLUTION OROPHARYNGEAL
Status: DISPENSED
Start: 2021-04-29

## (undated) RX ORDER — FENTANYL CITRATE 50 UG/ML
INJECTION, SOLUTION INTRAMUSCULAR; INTRAVENOUS
Status: DISPENSED
Start: 2022-11-02

## (undated) RX ORDER — IPRATROPIUM BROMIDE AND ALBUTEROL SULFATE 2.5; .5 MG/3ML; MG/3ML
SOLUTION RESPIRATORY (INHALATION)
Status: DISPENSED
Start: 2022-11-02